# Patient Record
Sex: FEMALE | URBAN - METROPOLITAN AREA
[De-identification: names, ages, dates, MRNs, and addresses within clinical notes are randomized per-mention and may not be internally consistent; named-entity substitution may affect disease eponyms.]

---

## 2022-04-12 ENCOUNTER — APPOINTMENT (RX ONLY)
Dept: URBAN - METROPOLITAN AREA CLINIC 329 | Facility: CLINIC | Age: 37
Setting detail: DERMATOLOGY
End: 2022-04-12

## 2022-04-12 DIAGNOSIS — L65.9 NONSCARRING HAIR LOSS, UNSPECIFIED: ICD-10-CM | Status: INADEQUATELY CONTROLLED

## 2022-04-12 PROCEDURE — ? PRESCRIPTION

## 2022-04-12 PROCEDURE — ? PRESCRIPTION MEDICATION MANAGEMENT

## 2022-04-12 PROCEDURE — ? ADDITIONAL NOTES

## 2022-04-12 PROCEDURE — 99204 OFFICE O/P NEW MOD 45 MIN: CPT

## 2022-04-12 PROCEDURE — ? PHOTO-DOCUMENTATION

## 2022-04-12 PROCEDURE — ? COUNSELING

## 2022-04-12 PROCEDURE — ? FULL BODY SKIN EXAM - DECLINED

## 2022-04-12 RX ORDER — CLOBETASOL PROPIONATE 0.5 MG/ML
SOLUTION TOPICAL
Qty: 1 | Refills: 1 | Status: ERX | COMMUNITY
Start: 2022-04-12

## 2022-04-12 RX ADMIN — CLOBETASOL PROPIONATE: 0.5 SOLUTION TOPICAL at 00:00

## 2022-04-12 ASSESSMENT — LOCATION SIMPLE DESCRIPTION DERM
LOCATION SIMPLE: LEFT SCALP
LOCATION SIMPLE: LEFT SCALP
LOCATION SIMPLE: ANTERIOR SCALP
LOCATION SIMPLE: SCALP

## 2022-04-12 ASSESSMENT — LOCATION ZONE DERM
LOCATION ZONE: SCALP
LOCATION ZONE: SCALP

## 2022-04-12 ASSESSMENT — LOCATION DETAILED DESCRIPTION DERM
LOCATION DETAILED: LEFT MEDIAL FRONTAL SCALP
LOCATION DETAILED: MID-FRONTAL SCALP
LOCATION DETAILED: LEFT SUPERIOR PARIETAL SCALP
LOCATION DETAILED: LEFT MEDIAL FRONTAL SCALP

## 2022-04-12 NOTE — PROCEDURE: PRESCRIPTION MEDICATION MANAGEMENT
Initiate Treatment: Clobetasol solution qhs until two bottles are finished
Continue Regimen: Multivitamin \\nRogaine
Render In Strict Bullet Format?: No
Detail Level: Zone

## 2023-10-25 ENCOUNTER — ANESTHESIA EVENT (OUTPATIENT)
Dept: SURGERY | Age: 38
End: 2023-10-25
Payer: COMMERCIAL

## 2023-10-25 ENCOUNTER — ANESTHESIA (OUTPATIENT)
Dept: SURGERY | Age: 38
End: 2023-10-25
Payer: COMMERCIAL

## 2023-10-25 ENCOUNTER — HOSPITAL ENCOUNTER (OUTPATIENT)
Age: 38
Setting detail: OUTPATIENT SURGERY
Discharge: HOME | End: 2023-10-25
Attending: OBSTETRICS & GYNECOLOGY | Admitting: OBSTETRICS & GYNECOLOGY
Payer: COMMERCIAL

## 2023-10-25 VITALS
SYSTOLIC BLOOD PRESSURE: 100 MMHG | RESPIRATION RATE: 14 BRPM | WEIGHT: 126.2 LBS | OXYGEN SATURATION: 100 % | HEIGHT: 63 IN | DIASTOLIC BLOOD PRESSURE: 63 MMHG | BODY MASS INDEX: 22.36 KG/M2 | HEART RATE: 71 BPM | TEMPERATURE: 97.9 F

## 2023-10-25 PROBLEM — O02.1 MISSED ABORTION: Status: ACTIVE | Noted: 2023-10-25

## 2023-10-25 LAB
ABO + RH BLD: NORMAL
BLOOD BANK CMNT PATIENT-IMP: NORMAL
BLOOD GROUP ANTIBODIES SERPL: NORMAL
BLOOD GROUP ANTIBODIES SERPL: NORMAL
SPECIMEN EXP DATE BLD: NORMAL
WEAK D AG RBC QL: NORMAL

## 2023-10-25 PROCEDURE — 6370000000 HC RX 637 (ALT 250 FOR IP): Performed by: ANESTHESIOLOGY

## 2023-10-25 PROCEDURE — 3600000002 HC SURGERY LEVEL 2 BASE: Performed by: OBSTETRICS & GYNECOLOGY

## 2023-10-25 PROCEDURE — 7100000001 HC PACU RECOVERY - ADDTL 15 MIN: Performed by: OBSTETRICS & GYNECOLOGY

## 2023-10-25 PROCEDURE — 2580000003 HC RX 258: Performed by: ANESTHESIOLOGY

## 2023-10-25 PROCEDURE — 2709999900 HC NON-CHARGEABLE SUPPLY: Performed by: OBSTETRICS & GYNECOLOGY

## 2023-10-25 PROCEDURE — 7100000011 HC PHASE II RECOVERY - ADDTL 15 MIN: Performed by: OBSTETRICS & GYNECOLOGY

## 2023-10-25 PROCEDURE — 86870 RBC ANTIBODY IDENTIFICATION: CPT

## 2023-10-25 PROCEDURE — 6360000002 HC RX W HCPCS: Performed by: ANESTHESIOLOGY

## 2023-10-25 PROCEDURE — 3700000000 HC ANESTHESIA ATTENDED CARE: Performed by: OBSTETRICS & GYNECOLOGY

## 2023-10-25 PROCEDURE — 7100000000 HC PACU RECOVERY - FIRST 15 MIN: Performed by: OBSTETRICS & GYNECOLOGY

## 2023-10-25 PROCEDURE — 7100000010 HC PHASE II RECOVERY - FIRST 15 MIN: Performed by: OBSTETRICS & GYNECOLOGY

## 2023-10-25 PROCEDURE — 6360000002 HC RX W HCPCS: Performed by: NURSE ANESTHETIST, CERTIFIED REGISTERED

## 2023-10-25 PROCEDURE — 3600000012 HC SURGERY LEVEL 2 ADDTL 15MIN: Performed by: OBSTETRICS & GYNECOLOGY

## 2023-10-25 PROCEDURE — 3700000001 HC ADD 15 MINUTES (ANESTHESIA): Performed by: OBSTETRICS & GYNECOLOGY

## 2023-10-25 PROCEDURE — 86901 BLOOD TYPING SEROLOGIC RH(D): CPT

## 2023-10-25 PROCEDURE — 2580000003 HC RX 258: Performed by: NURSE ANESTHETIST, CERTIFIED REGISTERED

## 2023-10-25 PROCEDURE — 86850 RBC ANTIBODY SCREEN: CPT

## 2023-10-25 PROCEDURE — 2500000003 HC RX 250 WO HCPCS: Performed by: OBSTETRICS & GYNECOLOGY

## 2023-10-25 PROCEDURE — 2500000003 HC RX 250 WO HCPCS: Performed by: NURSE ANESTHETIST, CERTIFIED REGISTERED

## 2023-10-25 PROCEDURE — 88305 TISSUE EXAM BY PATHOLOGIST: CPT

## 2023-10-25 PROCEDURE — 86900 BLOOD TYPING SEROLOGIC ABO: CPT

## 2023-10-25 PROCEDURE — 2580000003 HC RX 258: Performed by: OBSTETRICS & GYNECOLOGY

## 2023-10-25 RX ORDER — DEXTROSE MONOHYDRATE 100 MG/ML
INJECTION, SOLUTION INTRAVENOUS CONTINUOUS PRN
Status: DISCONTINUED | OUTPATIENT
Start: 2023-10-25 | End: 2023-10-25 | Stop reason: HOSPADM

## 2023-10-25 RX ORDER — ACETAMINOPHEN 500 MG
1000 TABLET ORAL ONCE
Status: COMPLETED | OUTPATIENT
Start: 2023-10-25 | End: 2023-10-25

## 2023-10-25 RX ORDER — SODIUM CHLORIDE 9 MG/ML
INJECTION, SOLUTION INTRAVENOUS PRN
Status: DISCONTINUED | OUTPATIENT
Start: 2023-10-25 | End: 2023-10-25 | Stop reason: HOSPADM

## 2023-10-25 RX ORDER — DIPHENHYDRAMINE HYDROCHLORIDE 50 MG/ML
12.5 INJECTION INTRAMUSCULAR; INTRAVENOUS
Status: DISCONTINUED | OUTPATIENT
Start: 2023-10-25 | End: 2023-10-25 | Stop reason: HOSPADM

## 2023-10-25 RX ORDER — MIDAZOLAM HYDROCHLORIDE 2 MG/2ML
2 INJECTION, SOLUTION INTRAMUSCULAR; INTRAVENOUS
Status: COMPLETED | OUTPATIENT
Start: 2023-10-25 | End: 2023-10-25

## 2023-10-25 RX ORDER — HALOPERIDOL 5 MG/ML
1 INJECTION INTRAMUSCULAR
Status: DISCONTINUED | OUTPATIENT
Start: 2023-10-25 | End: 2023-10-25 | Stop reason: HOSPADM

## 2023-10-25 RX ORDER — SODIUM CHLORIDE 0.9 % (FLUSH) 0.9 %
5-40 SYRINGE (ML) INJECTION EVERY 12 HOURS SCHEDULED
Status: DISCONTINUED | OUTPATIENT
Start: 2023-10-25 | End: 2023-10-25 | Stop reason: HOSPADM

## 2023-10-25 RX ORDER — PROPOFOL 10 MG/ML
INJECTION, EMULSION INTRAVENOUS PRN
Status: DISCONTINUED | OUTPATIENT
Start: 2023-10-25 | End: 2023-10-25 | Stop reason: SDUPTHER

## 2023-10-25 RX ORDER — FENTANYL CITRATE 50 UG/ML
INJECTION, SOLUTION INTRAMUSCULAR; INTRAVENOUS PRN
Status: DISCONTINUED | OUTPATIENT
Start: 2023-10-25 | End: 2023-10-25 | Stop reason: SDUPTHER

## 2023-10-25 RX ORDER — SODIUM CHLORIDE, SODIUM LACTATE, POTASSIUM CHLORIDE, CALCIUM CHLORIDE 600; 310; 30; 20 MG/100ML; MG/100ML; MG/100ML; MG/100ML
INJECTION, SOLUTION INTRAVENOUS CONTINUOUS
Status: DISCONTINUED | OUTPATIENT
Start: 2023-10-25 | End: 2023-10-25 | Stop reason: HOSPADM

## 2023-10-25 RX ORDER — IBUPROFEN 800 MG/1
800 TABLET ORAL EVERY 6 HOURS PRN
Qty: 30 TABLET | Refills: 0 | Status: SHIPPED | OUTPATIENT
Start: 2023-10-25

## 2023-10-25 RX ORDER — LIDOCAINE HYDROCHLORIDE 10 MG/ML
1 INJECTION, SOLUTION INFILTRATION; PERINEURAL
Status: DISCONTINUED | OUTPATIENT
Start: 2023-10-25 | End: 2023-10-25 | Stop reason: HOSPADM

## 2023-10-25 RX ORDER — LIDOCAINE HYDROCHLORIDE 20 MG/ML
INJECTION, SOLUTION EPIDURAL; INFILTRATION; INTRACAUDAL; PERINEURAL PRN
Status: DISCONTINUED | OUTPATIENT
Start: 2023-10-25 | End: 2023-10-25 | Stop reason: SDUPTHER

## 2023-10-25 RX ORDER — OXYCODONE HYDROCHLORIDE 5 MG/1
5 TABLET ORAL
Status: DISCONTINUED | OUTPATIENT
Start: 2023-10-25 | End: 2023-10-25 | Stop reason: HOSPADM

## 2023-10-25 RX ORDER — SCOLOPAMINE TRANSDERMAL SYSTEM 1 MG/1
1 PATCH, EXTENDED RELEASE TRANSDERMAL ONCE
Status: DISCONTINUED | OUTPATIENT
Start: 2023-10-25 | End: 2023-10-25 | Stop reason: HOSPADM

## 2023-10-25 RX ORDER — ONDANSETRON 2 MG/ML
INJECTION INTRAMUSCULAR; INTRAVENOUS PRN
Status: DISCONTINUED | OUTPATIENT
Start: 2023-10-25 | End: 2023-10-25 | Stop reason: SDUPTHER

## 2023-10-25 RX ORDER — SODIUM CHLORIDE 0.9 % (FLUSH) 0.9 %
5-40 SYRINGE (ML) INJECTION PRN
Status: DISCONTINUED | OUTPATIENT
Start: 2023-10-25 | End: 2023-10-25 | Stop reason: HOSPADM

## 2023-10-25 RX ORDER — PROCHLORPERAZINE EDISYLATE 5 MG/ML
5 INJECTION INTRAMUSCULAR; INTRAVENOUS
Status: DISCONTINUED | OUTPATIENT
Start: 2023-10-25 | End: 2023-10-25 | Stop reason: HOSPADM

## 2023-10-25 RX ORDER — DEXAMETHASONE SODIUM PHOSPHATE 10 MG/ML
INJECTION INTRAMUSCULAR; INTRAVENOUS PRN
Status: DISCONTINUED | OUTPATIENT
Start: 2023-10-25 | End: 2023-10-25 | Stop reason: SDUPTHER

## 2023-10-25 RX ADMIN — ACETAMINOPHEN 1000 MG: 500 TABLET, FILM COATED ORAL at 12:01

## 2023-10-25 RX ADMIN — FENTANYL CITRATE 25 MCG: 50 INJECTION, SOLUTION INTRAMUSCULAR; INTRAVENOUS at 12:43

## 2023-10-25 RX ADMIN — DOXYCYCLINE 100 MG: 100 INJECTION, POWDER, LYOPHILIZED, FOR SOLUTION INTRAVENOUS at 12:38

## 2023-10-25 RX ADMIN — FENTANYL CITRATE 50 MCG: 50 INJECTION, SOLUTION INTRAMUSCULAR; INTRAVENOUS at 12:27

## 2023-10-25 RX ADMIN — DEXAMETHASONE SODIUM PHOSPHATE 10 MG: 10 INJECTION INTRAMUSCULAR; INTRAVENOUS at 12:37

## 2023-10-25 RX ADMIN — LIDOCAINE HYDROCHLORIDE 40 MG: 20 INJECTION, SOLUTION EPIDURAL; INFILTRATION; INTRACAUDAL; PERINEURAL at 12:27

## 2023-10-25 RX ADMIN — ONDANSETRON 4 MG: 2 INJECTION INTRAMUSCULAR; INTRAVENOUS at 12:45

## 2023-10-25 RX ADMIN — PHENYLEPHRINE HYDROCHLORIDE 100 MCG: 10 INJECTION INTRAVENOUS at 12:53

## 2023-10-25 RX ADMIN — PROPOFOL 200 MG: 10 INJECTION, EMULSION INTRAVENOUS at 12:32

## 2023-10-25 RX ADMIN — SODIUM CHLORIDE, POTASSIUM CHLORIDE, SODIUM LACTATE AND CALCIUM CHLORIDE: 600; 310; 30; 20 INJECTION, SOLUTION INTRAVENOUS at 12:06

## 2023-10-25 RX ADMIN — MIDAZOLAM 2 MG: 1 INJECTION INTRAMUSCULAR; INTRAVENOUS at 12:18

## 2023-10-25 RX ADMIN — FENTANYL CITRATE 25 MCG: 50 INJECTION, SOLUTION INTRAMUSCULAR; INTRAVENOUS at 12:45

## 2023-10-25 ASSESSMENT — PAIN - FUNCTIONAL ASSESSMENT: PAIN_FUNCTIONAL_ASSESSMENT: 0-10

## 2023-10-25 ASSESSMENT — PAIN SCALES - GENERAL
PAINLEVEL_OUTOF10: 3
PAINLEVEL_OUTOF10: 2

## 2023-10-25 ASSESSMENT — PAIN DESCRIPTION - DESCRIPTORS: DESCRIPTORS: PRESSURE

## 2023-10-25 ASSESSMENT — PAIN DESCRIPTION - PAIN TYPE: TYPE: SURGICAL PAIN

## 2023-10-25 ASSESSMENT — PAIN DESCRIPTION - FREQUENCY: FREQUENCY: CONTINUOUS

## 2023-10-25 ASSESSMENT — PAIN DESCRIPTION - LOCATION: LOCATION: ABDOMEN

## 2023-10-25 NOTE — OP NOTE
GYN Operative Report    Patient: Gillian Jenkins MRN: [id number]  SSN: [id number]    YOB: 1985  Age: 41 y.o. Sex: female       Date of Surgery:  10/25/2023    Preoperative Diagnosis: Missed ab [O02.1] (8wks)    Postoperative Diagnosis: Same    Surgeon(s) and Role:     * Felix Alvarado MD - Primary    Anesthesia: General    Procedure: Dilation and curettage with suction    Estimated Blood Loss:  400cc    Drains: none    Implants:  * No implants in log *    Specimen:    ID Type Source Tests Collected by Time Destination   A : PRODUCTS OF CONCEPTION  Tissue Fetus SURGICAL PATHOLOGY Felix Alvarado MD 10/25/2023 1246         Findings:  Uterus enlarged 8wk size, boggy. Moderate amount of fetal tissue. Procedure: Patient was taken to the operating room where general anesthesia was obtained without difficulty. She was placed in a dorsal lithotomy position with yellow fin stirrups. She was prepped and draped in the usual sterile fashion. Doxycycline 100 mg IV was given OCTOR. A time out was performed to ensure it was the correct patient, correct procedure, and to identify all allergies. A weighted speculum was placed in the vagina and the cervix identified. A tenaculum was placed on the anterior lip of the cervix. Sierra dilators were used to dilate to 34 [place]. A 10mm suction was then introduced into the uterine cavity to the fundus and suction applied to evacuate POCs. A gentle sharp curettage was then performed and good uterine cri noted 360 degrees around the uterine cavity. Despite cavity seeming empty, bleeding was increased. Suction curettage once again performed with 3 passes. This improved but did not stop bleeding. Uterine massaged performed and bleeding stopped. A gentle sharp curettage was then performed and good uterine cri noted 360 degrees around the uterine cavity. Massage performed again. Scant bleeding noted. Tenaculum was removed and sites were hemostatic.  All instruments removed from

## 2023-10-25 NOTE — ANESTHESIA PRE PROCEDURE
Pulmonary:Negative Pulmonary ROS and normal exam                               Cardiovascular:Negative CV ROS                      Neuro/Psych:               GI/Hepatic/Renal: Neg GI/Hepatic/Renal ROS            Endo/Other:                      ROS comment: Missed  Abdominal:             Vascular: Other Findings:           Anesthesia Plan      general     ASA 1 - emergent     (LMA)  Induction: intravenous. MIPS: Postoperative opioids intended and Prophylactic antiemetics administered. Anesthetic plan and risks discussed with patient and spouse.                         Antonia Kussmaul, MD   10/25/2023

## 2023-10-25 NOTE — ANESTHESIA POSTPROCEDURE EVALUATION
Department of Anesthesiology  Postprocedure Note    Patient: Radha Duron  MRN: [id number]  YOB: 1985  Date of evaluation: 10/25/2023      Procedure Summary     Date: 10/25/23 Room / Location: [facility] MAIN OR [address] [place] [address] MAIN OR    Anesthesia Start: 1247 Anesthesia Stop: 0789    Procedure: DILATATION AND CURETTAGE SUCTION (Vagina ) Diagnosis:       Missed ab      (Missed ab [O02.1])    Surgeons: Dileep Rojas MD Responsible Provider: Cate Vergara MD    Anesthesia Type: General ASA Status: 1 - Emergent          Anesthesia Type: General    Laura Phase I: Laura Score: 10    Laura Phase II:        Anesthesia Post Evaluation    Patient location during evaluation: PACU  Patient participation: complete - patient participated  Level of consciousness: awake and alert  Airway patency: patent  Nausea: well controlled. Complications: no  Cardiovascular status: acceptable.   Respiratory status: acceptable  Hydration status: stable  Pain management: adequate

## 2024-05-14 ENCOUNTER — OFFICE VISIT (OUTPATIENT)
Dept: OBGYN CLINIC | Age: 39
End: 2024-05-14
Payer: COMMERCIAL

## 2024-05-14 VITALS
DIASTOLIC BLOOD PRESSURE: 68 MMHG | WEIGHT: 132 LBS | BODY MASS INDEX: 23.39 KG/M2 | SYSTOLIC BLOOD PRESSURE: 104 MMHG | HEIGHT: 63 IN

## 2024-05-14 DIAGNOSIS — Z31.69 INFERTILITY COUNSELING: Primary | ICD-10-CM

## 2024-05-14 PROCEDURE — 99203 OFFICE O/P NEW LOW 30 MIN: CPT | Performed by: OBSTETRICS & GYNECOLOGY

## 2024-05-14 RX ORDER — PNV NO.95/FERROUS FUM/FOLIC AC 28MG-0.8MG
1 TABLET ORAL DAILY
Qty: 30 TABLET | Refills: 9 | Status: SHIPPED | OUTPATIENT
Start: 2024-05-14

## 2024-05-14 NOTE — PROGRESS NOTES
CC:   Chief Complaint   Patient presents with    New Patient     Conception counseling - previous miscarriage in 10/2023 (D&C)         HPI:    Murtaza  is a 38 y.o. , , Patient's last menstrual period was 2024.,  who is seen for preconception counseling and concern about fertility.     Menses:  Regular     10/2023 8 week MAB s/p D&C  Trying x 3 months. + Ovulation. Timing intercourse around ovulation, but unable to last month.   Regular cycles   Non-smoker with no medical issues   Same father as SAB      GYN HISTORY:  As per HPI     Hx STDs:  No    Last Pap:  DUE    Current Outpatient Medications on File Prior to Visit   Medication Sig Dispense Refill    [id number]-M0-PT (PRENA1 PO) Take by mouth       No current facility-administered medications on file prior to visit.         No past medical history on file.      Past Surgical History:   Procedure Laterality Date    DILATION AND CURETTAGE OF UTERUS N/A 10/25/2023    DILATATION AND CURETTAGE SUCTION performed by Breanna Del Valle MD at [facility] [place]         Outpatient Encounter Medications as of 2024   Medication Sig Dispense Refill    Prenatal Vit-Fe Fumarate-FA (PRENATAL VITAMIN) 27-0.8 MG TABS Take 1 tablet by mouth daily 30 tablet 9    Eqfbit-U4-R9-S02-X8-GQ (PRENA1 PO) Take by mouth      [DISCONTINUED] ibuprofen (ADVIL;MOTRIN) 800 MG tablet Take 1 tablet by mouth every 6 hours as needed for Pain 30 tablet 0     No facility-administered encounter medications on file as of 2024.         Allergies   Allergen Reactions    Pcn [Penicillins] Angioedema         Family History   Problem Relation Age of Onset    Breast Cancer Neg Hx     Colon Cancer Neg Hx     Ovarian Cancer Neg Hx          Social History     Socioeconomic History    Marital status:    Tobacco Use    Smoking status: Never    Smokeless tobacco: Never   Vaping Use    Vaping Use: Never used   Substance and Sexual Activity    Alcohol use: Not Currently    Drug use: Not

## 2024-05-31 ENCOUNTER — NURSE ONLY (OUTPATIENT)
Dept: OBGYN CLINIC | Age: 39
End: 2024-05-31

## 2024-05-31 DIAGNOSIS — Z31.69 INFERTILITY COUNSELING: ICD-10-CM

## 2024-05-31 LAB
ESTRADIOL SERPL-MCNC: 45.1 PG/ML
FSH SERPL-ACNC: 7.5 MIU/ML
LH SERPL-ACNC: 3.8 MIU/ML
PROGEST SERPL-MCNC: 0.29 NG/ML
TSH W FREE THYROID IF ABNORMAL: 0.88 UIU/ML (ref 0.27–4.2)

## 2024-06-06 LAB — MIS SERPL-MCNC: 1.19 NG/ML

## 2024-06-27 ENCOUNTER — PATIENT MESSAGE (OUTPATIENT)
Dept: OBGYN CLINIC | Age: 39
End: 2024-06-27

## 2024-07-08 ENCOUNTER — NURSE ONLY (OUTPATIENT)
Dept: OBGYN CLINIC | Age: 39
End: 2024-07-08

## 2024-07-08 DIAGNOSIS — Z32.01 POSITIVE PREGNANCY TEST: Primary | ICD-10-CM

## 2024-07-08 DIAGNOSIS — Z32.01 POSITIVE PREGNANCY TEST: ICD-10-CM

## 2024-07-08 LAB — HCG SERPL-ACNC: NORMAL MIU/ML

## 2024-07-12 ENCOUNTER — LAB (OUTPATIENT)
Dept: OBGYN CLINIC | Age: 39
End: 2024-07-12

## 2024-07-12 DIAGNOSIS — Z32.01 POSITIVE PREGNANCY TEST: ICD-10-CM

## 2024-07-12 DIAGNOSIS — Z32.01 POSITIVE PREGNANCY TEST: Primary | ICD-10-CM

## 2024-07-12 LAB — HCG SERPL-ACNC: NORMAL MIU/ML

## 2024-07-30 ENCOUNTER — PROCEDURE VISIT (OUTPATIENT)
Dept: OBGYN CLINIC | Age: 39
End: 2024-07-30
Payer: COMMERCIAL

## 2024-07-30 ENCOUNTER — OFFICE VISIT (OUTPATIENT)
Dept: OBGYN CLINIC | Age: 39
End: 2024-07-30
Payer: COMMERCIAL

## 2024-07-30 VITALS
BODY MASS INDEX: 23.74 KG/M2 | HEIGHT: 63 IN | WEIGHT: 134 LBS | SYSTOLIC BLOOD PRESSURE: 110 MMHG | DIASTOLIC BLOOD PRESSURE: 74 MMHG

## 2024-07-30 DIAGNOSIS — N92.6 MISSED MENSES: ICD-10-CM

## 2024-07-30 DIAGNOSIS — Z32.01 POSITIVE PREGNANCY TEST: Primary | ICD-10-CM

## 2024-07-30 DIAGNOSIS — O09.519 ENCOUNTER FOR SUPERVISION OF HIGH RISK PREGNANCY DUE TO ADVANCED MATERNAL AGE IN PRIMIGRAVIDA: Primary | ICD-10-CM

## 2024-07-30 PROCEDURE — 99214 OFFICE O/P EST MOD 30 MIN: CPT | Performed by: OBSTETRICS & GYNECOLOGY

## 2024-07-30 PROCEDURE — 76830 TRANSVAGINAL US NON-OB: CPT | Performed by: OBSTETRICS & GYNECOLOGY

## 2024-07-30 RX ORDER — ASPIRIN 81 MG/1
162 TABLET ORAL DAILY
Qty: 60 TABLET | Refills: 12 | Status: SHIPPED | OUTPATIENT
Start: 2024-07-30

## 2024-07-30 RX ORDER — PYRIDOXINE HCL (VITAMIN B6) 25 MG
25 TABLET ORAL DAILY
COMMUNITY

## 2024-07-30 RX ORDER — MULTIVIT WITH MINERALS/LUTEIN
250 TABLET ORAL DAILY
COMMUNITY

## 2024-07-30 NOTE — PROGRESS NOTES
CC:  Missed Period      HPI:  38 y.o.  presents for pregnancy confirmation and establish SATYA.  Patient's last menstrual period was 2024.,  sure, regular cycles.      Mild n/v  No vb/cramping      OB hx:    Fam hx any chromosomal or inheritable disorders: No      Her Ob history is as follows:  # 1 - Date: None, Sex: None, Weight: None, GA: None, Delivery: None, Apgar1: None, Apgar5: None, Living: None, Birth Comments: None      Past medical History:   Active Ambulatory Problems     Diagnosis Date Noted    Missed  10/25/2023     Resolved Ambulatory Problems     Diagnosis Date Noted    No Resolved Ambulatory Problems     No Additional Past Medical History       Current Outpatient Medications   Medication Sig    Calcium Carbonate-Vitamin D (OSCAL 500 D-3 PO) Take 600 mg by mouth 2 times daily (with meals)    Ascorbic Acid (VITAMIN C) 250 MG tablet Take 1 tablet by mouth daily    pyridoxine (B-6) 25 MG tablet Take 1 tablet by mouth daily    aspirin 81 MG EC tablet Take 2 tablets by mouth daily    Prenatal Vit-Fe Fumarate-FA (PRENATAL VITAMIN) 27-0.8 MG TABS Take 1 tablet by mouth daily     No current facility-administered medications for this visit.       Past Surgical:  has a past surgical history that includes Dilation and curettage of uterus (N/A, 10/25/2023).    Allergies   Allergen Reactions    Pcn [Penicillins] Angioedema         Current Outpatient Medications on File Prior to Visit   Medication Sig Dispense Refill    Calcium Carbonate-Vitamin D (OSCAL 500 D-3 PO) Take 600 mg by mouth 2 times daily (with meals)      Ascorbic Acid (VITAMIN C) 250 MG tablet Take 1 tablet by mouth daily      pyridoxine (B-6) 25 MG tablet Take 1 tablet by mouth daily      Prenatal Vit-Fe Fumarate-FA (PRENATAL VITAMIN) 27-0.8 MG TABS Take 1 tablet by mouth daily 30 tablet 9     No current facility-administered medications on file prior to visit.       GYN hx:    Murtaza  reports that she has never smoked. She has

## 2024-08-05 NOTE — PROGRESS NOTES
NOB consult with pt. Labs today: Prenatal panel, PIH, Carrier testing, NIPT. Offered pt the option of CF, SMA, and Fragile X carrier testing. Pt desires testing. No previous testing in chart. Offered pt the option of genetic screening (1st screen vs Tetra vs NIPT). Pt desires NIPT. Instructed pt on exercise/nutrition in pregnancy. Reviewed [facility] preg book. Advised pt on using [facility] for hospital needs and [facility] L&D for pregnancy related emergencies. IMPACT[facility] Program discussed with patient and sheet given to her during visit today. Encouraged her to inform us should she start having increase in depression/anxiety. Pt states understanding. NOB forms signed, scanned, and given to pt.     Vitals:  Weight: 136 lb  BMI: 24  BP: 208/76    Medical Hx: Advanced maternal age (2024). - History of MRSA (2012) MRSA on buttocks.    Surgical Hx: Dilation and curettage of uterus (2023). - [place] tooth extraction.    Last Pap: 08/2021 WNL.    Pt OB c/o: Patient states that she is currently taking care of her mother who lives in [place] and drives two hours each way to care for her. Patient states she is extremely stressed and anxious, voices that she would like to see if she can get medication for stress/anxiety. States \"this is not like me.\" Message sent to Dr Mac regarding request.      Fam hx any chromosomal or inheritable disorders: None.    COVID Vaccine: x0 per patient  Flu Vaccine: Discussed flu recommendations with patient. Patient declines flu vaccine.    OB hx: G1: 2023, SAB.   G2: 08/2024, Current.     NV: Next appointment scheduled on 9/3 with Dr Mac

## 2024-08-12 ENCOUNTER — NURSE ONLY (OUTPATIENT)
Dept: OBGYN CLINIC | Age: 39
End: 2024-08-12

## 2024-08-12 VITALS
BODY MASS INDEX: 25.03 KG/M2 | SYSTOLIC BLOOD PRESSURE: 108 MMHG | DIASTOLIC BLOOD PRESSURE: 76 MMHG | HEIGHT: 62 IN | WEIGHT: 136 LBS

## 2024-08-12 DIAGNOSIS — Z3A.10 10 WEEKS GESTATION OF PREGNANCY: Primary | ICD-10-CM

## 2024-08-12 DIAGNOSIS — O09.521 ADVANCED MATERNAL AGE IN MULTIGRAVIDA, FIRST TRIMESTER: ICD-10-CM

## 2024-08-12 LAB
ABO + RH BLD: NORMAL
BASOPHILS # BLD: 0 K/UL (ref 0–0.2)
BASOPHILS NFR BLD: 0 % (ref 0–2)
BLOOD GROUP ANTIBODIES SERPL: NORMAL
CREAT UR-MCNC: 16.2 MG/DL (ref 28–217)
DIFFERENTIAL METHOD BLD: ABNORMAL
EOSINOPHIL # BLD: 0 K/UL (ref 0–0.8)
EOSINOPHIL NFR BLD: 0 % (ref 0.5–7.8)
ERYTHROCYTE [DISTWIDTH] IN BLOOD BY AUTOMATED COUNT: 12.2 % (ref 11.9–14.6)
HCT VFR BLD AUTO: 38.6 % (ref 35.8–46.3)
HGB BLD-MCNC: 12.9 G/DL (ref 11.7–15.4)
IMM GRANULOCYTES # BLD AUTO: 0 K/UL (ref 0–0.5)
IMM GRANULOCYTES NFR BLD AUTO: 1 % (ref 0–5)
LYMPHOCYTES # BLD: 1.4 K/UL (ref 0.5–4.6)
LYMPHOCYTES NFR BLD: 17 % (ref 13–44)
MCH RBC QN AUTO: 30.9 PG (ref 26.1–32.9)
MCHC RBC AUTO-ENTMCNC: 33.4 G/DL (ref 31.4–35)
MCV RBC AUTO: 92.6 FL (ref 82–102)
MONOCYTES # BLD: 0.4 K/UL (ref 0.1–1.3)
MONOCYTES NFR BLD: 5 % (ref 4–12)
NEUTS SEG # BLD: 6.3 K/UL (ref 1.7–8.2)
NEUTS SEG NFR BLD: 77 % (ref 43–78)
NRBC # BLD: 0 K/UL (ref 0–0.2)
PLATELET # BLD AUTO: 302 K/UL (ref 150–450)
PMV BLD AUTO: 10.4 FL (ref 9.4–12.3)
PROT UR-MCNC: <6 MG/DL
PROT/CREAT UR-RTO: ABNORMAL
RBC # BLD AUTO: 4.17 M/UL (ref 4.05–5.2)
WBC # BLD AUTO: 8.1 K/UL (ref 4.3–11.1)

## 2024-08-13 LAB
ALBUMIN SERPL-MCNC: 3.7 G/DL (ref 3.5–5)
ALBUMIN/GLOB SERPL: 1.1 (ref 1–1.9)
ALP SERPL-CCNC: 54 U/L (ref 35–104)
ALT SERPL-CCNC: 7 U/L (ref 12–65)
ANION GAP SERPL CALC-SCNC: 13 MMOL/L (ref 9–18)
AST SERPL-CCNC: 14 U/L (ref 15–37)
BILIRUB SERPL-MCNC: 0.2 MG/DL (ref 0–1.2)
BUN SERPL-MCNC: 4 MG/DL (ref 6–23)
CALCIUM SERPL-MCNC: 9.4 MG/DL (ref 8.8–10.2)
CHLORIDE SERPL-SCNC: 103 MMOL/L (ref 98–107)
CO2 SERPL-SCNC: 22 MMOL/L (ref 20–28)
CREAT SERPL-MCNC: 0.49 MG/DL (ref 0.6–1.1)
GLOBULIN SER CALC-MCNC: 3.3 G/DL (ref 2.3–3.5)
GLUCOSE SERPL-MCNC: 74 MG/DL (ref 70–99)
HBV SURFACE AG SER QL: NONREACTIVE
HCV AB SER QL: NONREACTIVE
HIV 1+2 AB+HIV1 P24 AG SERPL QL IA: NONREACTIVE
HIV 1/2 RESULT COMMENT: NORMAL
LDH SERPL L TO P-CCNC: 150 U/L (ref 127–281)
POTASSIUM SERPL-SCNC: 3.8 MMOL/L (ref 3.5–5.1)
PROT SERPL-MCNC: 7 G/DL (ref 6.3–8.2)
RUBV IGG SERPL IA-ACNC: 79.2 IU/ML
SODIUM SERPL-SCNC: 137 MMOL/L (ref 136–145)
T PALLIDUM AB SER QL IA: NONREACTIVE
URATE SERPL-MCNC: 2.9 MG/DL (ref 2.5–7.1)

## 2024-08-14 LAB
BACTERIA SPEC CULT: NORMAL
BACTERIA SPEC CULT: NORMAL
SERVICE CMNT-IMP: NORMAL

## 2024-08-15 ENCOUNTER — TELEPHONE (OUTPATIENT)
Dept: OBGYN CLINIC | Age: 39
End: 2024-08-15

## 2024-08-15 NOTE — TELEPHONE ENCOUNTER
Have attempted to call patient x2 regarding an appointment to discuss anxiety and medication. LVM asking patient for return call.

## 2024-08-19 LAB
Lab: ABNORMAL
NTRA 22Q11.2 DELETION SYNDROME POPULATION-BASED RISK TEXT: ABNORMAL
NTRA 22Q11.2 DELETION SYNDROME RESULT TEXT: ABNORMAL
NTRA 22Q11.2 DELETION SYNDROME RISK SCORE TEXT: ABNORMAL
NTRA FETAL FRACTION: ABNORMAL
NTRA FETAL RHD SUMMARY: ABNORMAL
NTRA GENDER OF FETUS: ABNORMAL
NTRA MONOSOMY X AGE-BASED RISK TEXT: ABNORMAL
NTRA MONOSOMY X RESULT TEXT: ABNORMAL
NTRA MONOSOMY X RISK SCORE TEXT: ABNORMAL
NTRA TRIPLOIDY RESULT TEXT: ABNORMAL
NTRA TRISOMY 13 AGE-BASED RISK TEXT: ABNORMAL
NTRA TRISOMY 13 RESULT TEXT: ABNORMAL
NTRA TRISOMY 13 RISK SCORE TEXT: ABNORMAL
NTRA TRISOMY 18 AGE-BASED RISK TEXT: ABNORMAL
NTRA TRISOMY 18 RESULT TEXT: ABNORMAL
NTRA TRISOMY 18 RISK SCORE TEXT: ABNORMAL
NTRA TRISOMY 21 AGE-BASED RISK TEXT: ABNORMAL
NTRA TRISOMY 21 RESULT TEXT: ABNORMAL
NTRA TRISOMY 21 RISK SCORE TEXT: ABNORMAL

## 2024-08-21 RX ORDER — ASPIRIN 81 MG/1
162 TABLET ORAL DAILY
Qty: 60 TABLET | Refills: 12 | Status: SHIPPED | OUTPATIENT
Start: 2024-08-21

## 2024-08-30 ENCOUNTER — APPOINTMENT (OUTPATIENT)
Dept: ULTRASOUND IMAGING | Age: 39
End: 2024-08-30
Payer: COMMERCIAL

## 2024-08-30 ENCOUNTER — HOSPITAL ENCOUNTER (EMERGENCY)
Age: 39
Discharge: HOME | End: 2024-08-30
Payer: COMMERCIAL

## 2024-08-30 VITALS
HEART RATE: 87 BPM | SYSTOLIC BLOOD PRESSURE: 105 MMHG | DIASTOLIC BLOOD PRESSURE: 72 MMHG | WEIGHT: 136 LBS | BODY MASS INDEX: 25.03 KG/M2 | OXYGEN SATURATION: 93 % | RESPIRATION RATE: 18 BRPM | TEMPERATURE: 97.9 F | HEIGHT: 62 IN

## 2024-08-30 DIAGNOSIS — O20.0 THREATENED ABORTION, ANTEPARTUM: Primary | ICD-10-CM

## 2024-08-30 LAB
ALBUMIN SERPL-MCNC: 3.4 G/DL (ref 3.5–5)
ALBUMIN/GLOB SERPL: 1.1 (ref 1–1.9)
ALP SERPL-CCNC: 54 U/L (ref 35–104)
ALT SERPL-CCNC: 13 U/L (ref 12–65)
ANION GAP SERPL CALC-SCNC: 12 MMOL/L (ref 9–18)
APPEARANCE UR: CLEAR
AST SERPL-CCNC: 16 U/L (ref 15–37)
BASOPHILS # BLD: 0 K/UL (ref 0–0.2)
BASOPHILS NFR BLD: 0 % (ref 0–2)
BILIRUB SERPL-MCNC: <0.2 MG/DL (ref 0–1.2)
BILIRUB UR QL: NEGATIVE
BUN SERPL-MCNC: 5 MG/DL (ref 6–23)
CALCIUM SERPL-MCNC: 8.6 MG/DL (ref 8.8–10.2)
CHLORIDE SERPL-SCNC: 103 MMOL/L (ref 98–107)
CO2 SERPL-SCNC: 21 MMOL/L (ref 20–28)
COLOR UR: NORMAL
CREAT SERPL-MCNC: 0.44 MG/DL (ref 0.6–1.1)
DIFFERENTIAL METHOD BLD: ABNORMAL
EOSINOPHIL # BLD: 0.1 K/UL (ref 0–0.8)
EOSINOPHIL NFR BLD: 1 % (ref 0.5–7.8)
ERYTHROCYTE [DISTWIDTH] IN BLOOD BY AUTOMATED COUNT: 12.7 % (ref 11.9–14.6)
GLOBULIN SER CALC-MCNC: 3.1 G/DL (ref 2.3–3.5)
GLUCOSE SERPL-MCNC: 79 MG/DL (ref 70–99)
GLUCOSE UR STRIP.AUTO-MCNC: NEGATIVE MG/DL
HCG SERPL-ACNC: NORMAL MIU/ML
HCT VFR BLD AUTO: 36.1 % (ref 35.8–46.3)
HGB BLD-MCNC: 12.4 G/DL (ref 11.7–15.4)
HGB UR QL STRIP: NEGATIVE
IMM GRANULOCYTES # BLD AUTO: 0 K/UL (ref 0–0.5)
IMM GRANULOCYTES NFR BLD AUTO: 0 % (ref 0–5)
KETONES UR QL STRIP.AUTO: NEGATIVE MG/DL
LEUKOCYTE ESTERASE UR QL STRIP.AUTO: NEGATIVE
LYMPHOCYTES # BLD: 1.2 K/UL (ref 0.5–4.6)
LYMPHOCYTES NFR BLD: 12 % (ref 13–44)
MCH RBC QN AUTO: 30.2 PG (ref 26.1–32.9)
MCHC RBC AUTO-ENTMCNC: 34.3 G/DL (ref 31.4–35)
MCV RBC AUTO: 88 FL (ref 82–102)
MONOCYTES # BLD: 0.5 K/UL (ref 0.1–1.3)
MONOCYTES NFR BLD: 5 % (ref 4–12)
NEUTS SEG # BLD: 8.1 K/UL (ref 1.7–8.2)
NEUTS SEG NFR BLD: 82 % (ref 43–78)
NITRITE UR QL STRIP.AUTO: NEGATIVE
NRBC # BLD: 0 K/UL (ref 0–0.2)
PH UR STRIP: 7.5 (ref 5–9)
PLATELET # BLD AUTO: 269 K/UL (ref 150–450)
PMV BLD AUTO: 9.6 FL (ref 9.4–12.3)
POTASSIUM SERPL-SCNC: 3.6 MMOL/L (ref 3.5–5.1)
PROT SERPL-MCNC: 6.5 G/DL (ref 6.3–8.2)
PROT UR STRIP-MCNC: NEGATIVE MG/DL
RBC # BLD AUTO: 4.1 M/UL (ref 4.05–5.2)
SODIUM SERPL-SCNC: 136 MMOL/L (ref 136–145)
SP GR UR REFRACTOMETRY: <1.005 (ref 1–1.02)
UROBILINOGEN UR QL STRIP.AUTO: 0.2 EU/DL (ref 0.2–1)
WBC # BLD AUTO: 9.9 K/UL (ref 4.3–11.1)

## 2024-08-30 PROCEDURE — 6360000002 HC RX W HCPCS: Performed by: STUDENT IN AN ORGANIZED HEALTH CARE EDUCATION/TRAINING PROGRAM

## 2024-08-30 PROCEDURE — 84702 CHORIONIC GONADOTROPIN TEST: CPT

## 2024-08-30 PROCEDURE — 85025 COMPLETE CBC W/AUTO DIFF WBC: CPT

## 2024-08-30 PROCEDURE — 99284 EMERGENCY DEPT VISIT MOD MDM: CPT

## 2024-08-30 PROCEDURE — 80053 COMPREHEN METABOLIC PANEL: CPT

## 2024-08-30 PROCEDURE — 81003 URINALYSIS AUTO W/O SCOPE: CPT

## 2024-08-30 PROCEDURE — 96372 THER/PROPH/DIAG INJ SC/IM: CPT

## 2024-08-30 PROCEDURE — 76801 OB US < 14 WKS SINGLE FETUS: CPT

## 2024-08-30 RX ADMIN — RHO(D) IMMUNE GLOBULIN (HUMAN) 300 MCG: 1500 SOLUTION INTRAMUSCULAR at 16:22

## 2024-08-30 ASSESSMENT — ENCOUNTER SYMPTOMS
VOMITING: 0
SHORTNESS OF BREATH: 0
TROUBLE SWALLOWING: 0
PHOTOPHOBIA: 0
COUGH: 0
FACIAL SWELLING: 0

## 2024-08-30 NOTE — DISCHARGE INSTRUCTIONS
Your workup here was very reassuring overall.  As we discussed, you will need to follow-up with your OB/GYN at your scheduled appointment in 3 days.  Return here for new or worsening symptoms.    As we discussed, I did not find a life threatening cause of your symptoms today. However, THAT DOES NOT MEAN IT COULD NOT DEVELOP. If you develop ANY new or worsening symptoms, it is critical that you return for re-evaluation. This includes any symptoms that are concerning to you, especially symptoms such as severe abdominal pain, loss of consciousness, profuse vaginal bleeding, fevers.  If you do not return for re-evaluation, you risk serious complications, including death.

## 2024-08-30 NOTE — ED PROVIDER NOTES
Emergency Department Provider Note       PCP: No primary care provider on file.   Age: 38 y.o.   Sex: female     DISPOSITION Decision To Discharge 2024 04:00:15 PM  Condition at Disposition: Good       ICD-10-CM    1. Threatened , antepartum  O20.0           Medical Decision Making     In summary this is a 38-year-old female patient presenting for evaluation with concerns for possible vaginal bleeding in early pregnancy.  She did mention that she had cut herself shaving and I question whether this was the blood she saw.  Her UA did not show signs of blood today.  Her vitals were stable.  Hemoglobin unremarkable.  Ultrasound today showed normal fetal heart rate and no acute abnormalities.  Her abdominal exam is benign.  I do think she is safe for outpatient therapy.  She already has appointment scheduled with OB/GYN in 3 days which I feel is appropriate time to follow-up.  Counseled her on signs to return here for.  The patient has verbalized understanding and agreed to the plan.  Discharged in stable condition.  ED Course as of 24 1602   Fri Aug 30, 2024   1324 1:24 PM  No blood noted in the urine which is reassuring against vaginal bleeding [NR]      ED Course User Index  [NR] Ricardo Dorman PA     1 acute complicated illness or injury.  Patient was discharged risks and benefits of hospitalization were considered.  Shared medical decision making was utilized in creating the patients health plan today.  ED attending physician present in department at time of care. Based on current hospital policy, their co-signature is not required on this note.   I independently ordered and reviewed each unique test.       I interpreted the Ultrasound  iup.              History     38-year-old female patient G2, P1 approximately 13 weeks pregnant presents today with concerns for possible vaginal bleeding.  She states for the past few days she has had some increased intermittent bilateral abdominal

## 2024-08-30 NOTE — ED TRIAGE NOTES
Pt is 13 weeks pregnant.  Pt states that the day before yesterday, she started noticing a clear gel-like discharge when she wiped.  Pt states that it was very light pink this morning.  Pt also endorses some lower abdominal/back cramping.

## 2024-09-03 ENCOUNTER — ROUTINE PRENATAL (OUTPATIENT)
Dept: OBGYN CLINIC | Age: 39
End: 2024-09-03
Payer: COMMERCIAL

## 2024-09-03 VITALS — BODY MASS INDEX: 25.97 KG/M2 | SYSTOLIC BLOOD PRESSURE: 114 MMHG | WEIGHT: 142 LBS | DIASTOLIC BLOOD PRESSURE: 70 MMHG

## 2024-09-03 DIAGNOSIS — O09.521 ADVANCED MATERNAL AGE IN MULTIGRAVIDA, FIRST TRIMESTER: ICD-10-CM

## 2024-09-03 DIAGNOSIS — Z11.51 SCREENING FOR HPV (HUMAN PAPILLOMAVIRUS): ICD-10-CM

## 2024-09-03 DIAGNOSIS — Z11.3 SCREEN FOR STD (SEXUALLY TRANSMITTED DISEASE): ICD-10-CM

## 2024-09-03 DIAGNOSIS — Z12.4 PAP SMEAR FOR CERVICAL CANCER SCREENING: ICD-10-CM

## 2024-09-03 DIAGNOSIS — F41.9 ANXIETY: ICD-10-CM

## 2024-09-03 DIAGNOSIS — O09.519 ENCOUNTER FOR SUPERVISION OF HIGH RISK PREGNANCY DUE TO ADVANCED MATERNAL AGE IN PRIMIGRAVIDA: Primary | ICD-10-CM

## 2024-09-03 PROBLEM — O02.1 MISSED ABORTION: Status: RESOLVED | Noted: 2023-10-25 | Resolved: 2024-09-03

## 2024-09-03 PROCEDURE — 99213 OFFICE O/P EST LOW 20 MIN: CPT | Performed by: OBSTETRICS & GYNECOLOGY

## 2024-09-03 PROCEDURE — 0501F PRENATAL FLOW SHEET: CPT | Performed by: OBSTETRICS & GYNECOLOGY

## 2024-09-03 NOTE — PROGRESS NOTES
-PN labs reviewed. Carrier screen pending    -Flu vaccine recommended    -Low risk  Genetics   -ASA   -RTO 4wks for CURTIS    - Anatomy scan with MFM due to AMA    2) Anxiety  - EPDS 5 today. Seems mostly situational. Will hold off on meds now  - Consider if sx worsen. Encouraged to start counseling   -   Anxiety and Depression  The approach to depression and anxiety in pregnancy must look at the whole maternal-child cohort to assess risks and benefits.  depression is associated with an increased risk of multiple poor obstetrical outcomes, including spontaneous , bleeding, operative deliveries, and  birth. In a nationally representative survey in the [place] that identified pregnant women with major depression, only 50 percent received treatment. Untreated disease causes maternal suffering and is associated with poor nutrition, comorbid substance use disorders, poor adherence with prenatal care, postpartum depression, impaired relationships between the mother and her infant and other family members, and an increased risk of suicide.    It is important to assess the benefit of previous treatment in order to guide treatment selection.  If psychotherapy is indicated and the patient was successfully treated with a particular psychotherapy prior to pregnancy, the same therapy is used during pregnancy.   Similarly, if pharmacotherapy is indicated and the patient was successfully treated with a particular antidepressant prior to pregnancy, the same drug is used during pregnancy.    The risks of untreated moderate to severe maternal major depression, to both the mother and fetus, often outweigh the risks associated with antidepressants.     A national registry study (nearly 1,300,000 births) compared infants who were exposed to SSRIs in early pregnancy (n >10,000) with infants not exposed. After adjusting for potential confounds (eg, maternal age, smoking, and body mass index), the

## 2024-09-06 LAB
C TRACH RRNA CVX QL NAA+PROBE: NEGATIVE
COLLECTION METHOD: NORMAL
CYTOLOGIST CVX/VAG CYTO: NORMAL
CYTOLOGY CVX/VAG DOC THIN PREP: NORMAL
HPV APTIMA: NEGATIVE
HPV GENOTYPE REFLEX: NORMAL
Lab: NORMAL
N GONORRHOEA RRNA CVX QL NAA+PROBE: NEGATIVE
OTHER PT INFO: NORMAL
PAP SOURCE: NORMAL
PATH REPORT.FINAL DX SPEC: NORMAL
PREV CYTO INFO: NEGATIVE
PREV TREATMENT RESULTS: NORMAL
PREV TREATMENT: NORMAL
STAT OF ADQ CVX/VAG CYTO-IMP: NORMAL
T VAGINALIS RRNA SPEC QL NAA+PROBE: NEGATIVE

## 2024-09-10 ENCOUNTER — TELEPHONE (OUTPATIENT)
Dept: OBGYN CLINIC | Age: 39
End: 2024-09-10

## 2024-09-30 ENCOUNTER — ROUTINE PRENATAL (OUTPATIENT)
Dept: OBGYN CLINIC | Age: 39
End: 2024-09-30

## 2024-09-30 VITALS — WEIGHT: 150 LBS | BODY MASS INDEX: 27.44 KG/M2 | SYSTOLIC BLOOD PRESSURE: 122 MMHG | DIASTOLIC BLOOD PRESSURE: 72 MMHG

## 2024-09-30 DIAGNOSIS — Z3A.17 17 WEEKS GESTATION OF PREGNANCY: ICD-10-CM

## 2024-09-30 DIAGNOSIS — O09.519 ENCOUNTER FOR SUPERVISION OF HIGH RISK PREGNANCY DUE TO ADVANCED MATERNAL AGE IN PRIMIGRAVIDA: Primary | ICD-10-CM

## 2024-09-30 DIAGNOSIS — F41.9 ANXIETY: ICD-10-CM

## 2024-09-30 PROCEDURE — 0502F SUBSEQUENT PRENATAL CARE: CPT | Performed by: NURSE PRACTITIONER

## 2024-09-30 NOTE — PATIENT INSTRUCTIONS
PTL/labor precautions, FMC, and pregnancy warning signs reviewed. Pt advised to call the office at [phone] or go straight to Labor and Delivery at [facility] [place] with any of the following concerns vaginal bleeding, leaking of fluid, cory regularly Q 5-7 minutes for over an hour.

## 2024-09-30 NOTE — PROGRESS NOTES
OB return  Murtaza Robles is a 39 y.o. female   with 17w5d IUP with Estimated Date of Delivery: 3/5/25 presenting to the clinic as a routine OB. Here today with her .     She reports continued nausea however, states able to keep po foods and fluids. Denies vomiting.   Denies feeling regular contractions, leakage of fluid or vaginal bleeding. States not feeling any fetal movement yet.    She reports tripping and falling onto her knees yesterday. States \"I was brining groceries in and tripped over a brace that was on the floor and fell onto my knees and braced myself with my hands. I did not fall on my belly at all.\" She denies feeling contractions, vaginal bleeding or leakage of fluid after fall.     Denies HA, blurred vision or RUQ pain.     ++Cardiac activity with gross fetal movement seen on portable u/s via ipad today.     Problem list reviewed and updated    Pregnancy complicated by:  1. Advanced maternal age: D=8, Low risk NIPT (Fetal blood type RH +), ASA. Sees [facility] 10/23.     2. Anxiety: Patient states mood stable without any medications. Does not wish to start any meds. Encouraged her to utilize [facility] Impact[facility] program if needed.     Physical Examination:  /72   Wt 68 kg (150 lb)   LMP 2024   BMI 27.44 kg/m²    Gen: AAOx3  Ab: soft, NTTP, gravid uterus  Skin: no edema    Plan:  --She reports tripping and falling onto her knees yesterday. States \"I was brining groceries in and tripped over a brace that was on the floor and fell onto my knees and braced myself with my hands. I did not fall on my belly at all.\" She denies feeling contractions, vaginal bleeding or leakage of fluid after fall.   --++Cardiac activity with gross fetal movement seen on portable u/s via ipad today. Reassurance given  --Encouraged increase in water intake daily to prevent dehydration.   --RTC 4 weeks for eugenio. Sees [facility] 10/23    PTL/labor precautions, FMC, and pregnancy warning signs reviewed. Pt advised to

## 2024-10-07 RX ORDER — PRENATAL VIT/IRON FUM/FOLIC AC 27MG-0.8MG
1 TABLET ORAL DAILY
Qty: 90 TABLET | Refills: 1 | Status: SHIPPED | OUTPATIENT
Start: 2024-10-07

## 2024-10-08 ENCOUNTER — APPOINTMENT (OUTPATIENT)
Dept: ULTRASOUND IMAGING | Age: 39
End: 2024-10-08
Payer: COMMERCIAL

## 2024-10-08 ENCOUNTER — HOSPITAL ENCOUNTER (EMERGENCY)
Age: 39
Discharge: HOME | End: 2024-10-08
Attending: EMERGENCY MEDICINE
Payer: COMMERCIAL

## 2024-10-08 VITALS
HEART RATE: 90 BPM | DIASTOLIC BLOOD PRESSURE: 60 MMHG | WEIGHT: 150 LBS | SYSTOLIC BLOOD PRESSURE: 100 MMHG | HEIGHT: 62 IN | TEMPERATURE: 97.4 F | OXYGEN SATURATION: 98 % | RESPIRATION RATE: 16 BRPM | BODY MASS INDEX: 27.6 KG/M2

## 2024-10-08 DIAGNOSIS — R10.11 ABDOMINAL PAIN, RIGHT UPPER QUADRANT: Primary | ICD-10-CM

## 2024-10-08 LAB
ALBUMIN SERPL-MCNC: 3.3 G/DL (ref 3.5–5)
ALBUMIN/GLOB SERPL: 0.9 (ref 1–1.9)
ALP SERPL-CCNC: 60 U/L (ref 35–104)
ALT SERPL-CCNC: 19 U/L (ref 8–45)
ANION GAP SERPL CALC-SCNC: 11 MMOL/L (ref 9–18)
APPEARANCE UR: CLEAR
AST SERPL-CCNC: 38 U/L (ref 15–37)
BASOPHILS # BLD: 0 K/UL (ref 0–0.2)
BASOPHILS NFR BLD: 0 % (ref 0–2)
BILIRUB SERPL-MCNC: 0.2 MG/DL (ref 0–1.2)
BILIRUB UR QL: NEGATIVE
BUN SERPL-MCNC: 5 MG/DL (ref 6–23)
CALCIUM SERPL-MCNC: 9.2 MG/DL (ref 8.8–10.2)
CHLORIDE SERPL-SCNC: 101 MMOL/L (ref 98–107)
CO2 SERPL-SCNC: 22 MMOL/L (ref 20–28)
COLOR UR: NORMAL
CREAT SERPL-MCNC: 0.51 MG/DL (ref 0.6–1.1)
DIFFERENTIAL METHOD BLD: ABNORMAL
EOSINOPHIL # BLD: 0 K/UL (ref 0–0.8)
EOSINOPHIL NFR BLD: 0 % (ref 0.5–7.8)
ERYTHROCYTE [DISTWIDTH] IN BLOOD BY AUTOMATED COUNT: 13.3 % (ref 11.9–14.6)
GLOBULIN SER CALC-MCNC: 3.8 G/DL (ref 2.3–3.5)
GLUCOSE SERPL-MCNC: 95 MG/DL (ref 70–99)
GLUCOSE UR STRIP.AUTO-MCNC: NEGATIVE MG/DL
HCT VFR BLD AUTO: 34.5 % (ref 35.8–46.3)
HGB BLD-MCNC: 11.7 G/DL (ref 11.7–15.4)
HGB UR QL STRIP: NEGATIVE
IMM GRANULOCYTES # BLD AUTO: 0.1 K/UL (ref 0–0.5)
IMM GRANULOCYTES NFR BLD AUTO: 1 % (ref 0–5)
KETONES UR QL STRIP.AUTO: NEGATIVE MG/DL
LEUKOCYTE ESTERASE UR QL STRIP.AUTO: NEGATIVE
LIPASE SERPL-CCNC: 38 U/L (ref 13–60)
LYMPHOCYTES # BLD: 1.3 K/UL (ref 0.5–4.6)
LYMPHOCYTES NFR BLD: 11 % (ref 13–44)
MCH RBC QN AUTO: 31.3 PG (ref 26.1–32.9)
MCHC RBC AUTO-ENTMCNC: 33.9 G/DL (ref 31.4–35)
MCV RBC AUTO: 92.2 FL (ref 82–102)
MONOCYTES # BLD: 0.4 K/UL (ref 0.1–1.3)
MONOCYTES NFR BLD: 4 % (ref 4–12)
NEUTS SEG # BLD: 9.9 K/UL (ref 1.7–8.2)
NEUTS SEG NFR BLD: 84 % (ref 43–78)
NITRITE UR QL STRIP.AUTO: NEGATIVE
NRBC # BLD: 0 K/UL (ref 0–0.2)
PH UR STRIP: 7 (ref 5–9)
PLATELET # BLD AUTO: 294 K/UL (ref 150–450)
PMV BLD AUTO: 10.5 FL (ref 9.4–12.3)
POTASSIUM SERPL-SCNC: 3.6 MMOL/L (ref 3.5–5.1)
PROT SERPL-MCNC: ABNORMAL G/DL (ref 6.3–8.2)
PROT UR STRIP-MCNC: NEGATIVE MG/DL
RBC # BLD AUTO: 3.74 M/UL (ref 4.05–5.2)
SODIUM SERPL-SCNC: 134 MMOL/L (ref 136–145)
SP GR UR REFRACTOMETRY: 1.01 (ref 1–1.02)
UROBILINOGEN UR QL STRIP.AUTO: 0.2 EU/DL (ref 0.2–1)
WBC # BLD AUTO: 11.8 K/UL (ref 4.3–11.1)

## 2024-10-08 PROCEDURE — 76705 ECHO EXAM OF ABDOMEN: CPT

## 2024-10-08 PROCEDURE — 99284 EMERGENCY DEPT VISIT MOD MDM: CPT

## 2024-10-08 PROCEDURE — 85025 COMPLETE CBC W/AUTO DIFF WBC: CPT

## 2024-10-08 PROCEDURE — 81003 URINALYSIS AUTO W/O SCOPE: CPT

## 2024-10-08 PROCEDURE — 83690 ASSAY OF LIPASE: CPT

## 2024-10-08 PROCEDURE — 80053 COMPREHEN METABOLIC PANEL: CPT

## 2024-10-08 ASSESSMENT — PAIN SCALES - GENERAL
PAINLEVEL_OUTOF10: 0
PAINLEVEL_OUTOF10: 1

## 2024-10-08 ASSESSMENT — ENCOUNTER SYMPTOMS
COUGH: 0
RHINORRHEA: 0
SHORTNESS OF BREATH: 0
NAUSEA: 1

## 2024-10-08 ASSESSMENT — PAIN DESCRIPTION - ORIENTATION: ORIENTATION: RIGHT;UPPER

## 2024-10-08 ASSESSMENT — LIFESTYLE VARIABLES
HOW MANY STANDARD DRINKS CONTAINING ALCOHOL DO YOU HAVE ON A TYPICAL DAY: PATIENT DOES NOT DRINK
HOW OFTEN DO YOU HAVE A DRINK CONTAINING ALCOHOL: NEVER

## 2024-10-08 ASSESSMENT — PAIN DESCRIPTION - LOCATION: LOCATION: ABDOMEN

## 2024-10-08 ASSESSMENT — PAIN - FUNCTIONAL ASSESSMENT: PAIN_FUNCTIONAL_ASSESSMENT: 0-10

## 2024-10-08 NOTE — DISCHARGE INSTRUCTIONS
Eat a low fat diet to make it through pregnancy and delivery and follow up with GI to have your gallbladder evaluated.

## 2024-10-08 NOTE — ED TRIAGE NOTES
Patient report right rib/ RUQ pain for about the month to 2 months. Patient is 19 weeks pregnant.

## 2024-10-08 NOTE — ED PROVIDER NOTES
Emergency Department Provider Signout / Continuation of Care Note         DISPOSITION Decision To Discharge 10/08/2024 06:06:01 PM       ICD-10-CM    1. Abdominal pain, right upper quadrant  R10.11           The patient's care was signed out to me at shift change.      Final Plan      Patient with right upper quadrant abdominal pain.  Seems to be related to eating.  No acute on right upper quadrant ultrasound.  No gallbladder disease.  Will encourage patient to eat a low-fat diet to make it through the rest of her pregnancy and delivery and then have GI follow-up for her gallbladder evaluation.       1 or more acute illnesses that pose a threat to life or bodily function.   Patient was discharged risks and benefits of hospitalization were considered.  Shared medical decision making was utilized in creating the patients health plan today.       I interpreted the Ultrasound  no gall stones.                 Reid Schwarz III, MD  10/08/24 1462    
  Result Value Ref Range    Lipase 38 13 - 60 U/L   Comprehensive Metabolic Panel   Result Value Ref Range    Sodium 134 (L) 136 - 145 mmol/L    Potassium 3.6 3.5 - 5.1 mmol/L    Chloride 101 98 - 107 mmol/L    CO2 22 20 - 28 mmol/L    Anion Gap 11 9 - 18 mmol/L    Glucose 95 70 - 99 mg/dL    BUN 5 (L) 6 - 23 MG/DL    Creatinine 0.51 (L) 0.60 - 1.10 MG/DL    Est, Glom Filt Rate >90 >60 ml/min/1.73m2    Calcium 9.2 8.8 - 10.2 MG/DL    Total Bilirubin 0.2 0.0 - 1.2 MG/DL    ALT 19 8 - 45 U/L    AST 38 (H) 15 - 37 U/L    Alk Phosphatase 60 35 - 104 U/L    Total Protein INTERPRET RESULTS WITH CAUTION, SPECIMEN HEMOLYZED 6.3 - 8.2 g/dL    Albumin 3.3 (L) 3.5 - 5.0 g/dL    Globulin 3.8 (H) 2.3 - 3.5 g/dL    Albumin/Globulin Ratio 0.9 (L) 1.0 - 1.9           US ABDOMEN LIMITED Specify organ? GALLBLADDER   Final Result   Mildly prominent common duct which may be related to patient's   pregnancy. Otherwise unremarkable.         Electronically signed by Hang Powers                   No results for input(s): \"COVID19\" in the last 72 hours.    Voice dictation software was used during the making of this note.  This software is not perfect and grammatical and other typographical errors may be present.  This note has not been completely proofread for errors.        Cachorro Hyde MD  10/10/24 6299

## 2024-10-09 ENCOUNTER — TELEPHONE (OUTPATIENT)
Dept: OBGYN CLINIC | Age: 39
End: 2024-10-09

## 2024-10-09 NOTE — TELEPHONE ENCOUNTER
Patient left a voicemail on OB line stating that she has been having right rib pain after she eats for weeks and went to the ED yesterday. At the ED was told that her gallbladder is fine and they were unable to find a cause for the pain.   LVM asking patient for return call. Modalityt message sent as well.

## 2024-10-12 ENCOUNTER — HOSPITAL ENCOUNTER (OUTPATIENT)
Age: 39
Discharge: HOME | End: 2024-10-12
Attending: OBSTETRICS & GYNECOLOGY | Admitting: OBSTETRICS & GYNECOLOGY
Payer: COMMERCIAL

## 2024-10-12 VITALS
SYSTOLIC BLOOD PRESSURE: 122 MMHG | TEMPERATURE: 98.2 F | DIASTOLIC BLOOD PRESSURE: 80 MMHG | RESPIRATION RATE: 17 BRPM | HEART RATE: 93 BPM

## 2024-10-12 PROBLEM — N93.9 VAGINAL SPOTTING: Status: ACTIVE | Noted: 2024-10-12

## 2024-10-12 PROBLEM — N93.9 VAGINAL SPOTTING: Status: RESOLVED | Noted: 2024-10-12 | Resolved: 2024-10-12

## 2024-10-12 PROCEDURE — 99283 EMERGENCY DEPT VISIT LOW MDM: CPT

## 2024-10-12 NOTE — PROGRESS NOTES
OB ED     Pt to [facility] with complaints of vaginal bleeding.  Pt showed me a picture it was approx 1/2 inch long thin line of pinkish red blood on paper. No active bleeding now. Pt states that she had sex day before yesterday. Abd palpated soft. Positive fetal movement  Urine dip negative. Pt reassured and   with doppler.  denies LOF  [facility] notified of patient's arrival.

## 2024-10-12 NOTE — H&P
Obstetrics History & Physical/OB ED note    Name: Murtaza Robles MRN: [id number]     YOB: 1985  Age: 39 y.o.  Sex: female      Reason for Presentation:  vaginal spotting    HPI: Murtaza Robles is a 39 y.o.  female with Estimated Date of Delivery: 3/5/25 at 19w3d gestation. Her obstetrical history is significant for uncomplicated pregnancy.  Prenatal records reviewed.     Had one episode of spotting with wiping today. It was associated with a little bit of cramping.      She denies leakage of fluid. She denies contractions.     Past History:  OB History    Para Term  AB Living   2       1 0   SAB IAB Ectopic Molar Multiple Live Births   1         0      # Outcome Date GA Lbr Jonny/2nd Weight Sex Type Anes PTL Lv   2 Current            1 SAB 10/2023             Past Medical History:   Diagnosis Date    MRSA (methicillin resistant Staphylococcus aureus)      Past Surgical History:   Procedure Laterality Date    DILATION AND CURETTAGE OF UTERUS N/A 10/25/2023    DILATATION AND CURETTAGE SUCTION performed by Breanna Del Valle MD at [facility] [place] MAIN OR     Social History     Tobacco Use    Smoking status: Never    Smokeless tobacco: Never   Substance Use Topics    Alcohol use: Not Currently     Prior to Admission medications    Medication Sig Start Date End Date Taking? Authorizing Provider   Prenatal Vit-Fe Fumarate-FA (PRENATAL VITAMIN) 27-0.8 MG TABS TAKE 1 TABLET BY MOUTH EVERY DAY 10/7/24   Breanna Mac DO   aspirin 81 MG EC tablet Take 2 tablets by mouth daily 24   Breanna Mac DO   Calcium Carbonate-Vitamin D (OSCAL 500 D-3 PO) Take 600 mg by mouth 2 times daily (with meals)    Everardo Valdez MD   Ascorbic Acid (VITAMIN C) 250 MG tablet Take 1 tablet by mouth daily    Everardo Valdez MD   pyridoxine (B-6) 25 MG tablet Take 1 tablet by mouth daily    Everardo Valdez MD   aspirin 81 MG EC tablet Take 2 tablets by mouth daily 24   Breanna Mac  L, DO     Allergies   Allergen Reactions    Hydrocodone Shortness Of Breath    Pcn [Penicillins] Angioedema       Physical Exam:  VITALS:  /80   Pulse 93   Temp 98.2 °F (36.8 °C) (Oral)   Resp 17   LMP 05/29/2024     CONSTITUTIONAL:  awake, alert, cooperative, no apparent distress, and appears stated age  ABDOMEN:  non-tender  FHTs: Reassuring/appropriate for gestational age by [place];   Uterine activity/Contractions on monitor: None  Membranes: intact  Cervix: closed/thick/high; no blood on glove      Assessment:  19w3d gestation  No signs of labor or abruption  Reassuring fetal status    Plan: Discharge home, follow-up at next OB appointment

## 2024-10-16 PROBLEM — O09.512 HIGH-RISK PREGNANCY, PRIMIGRAVIDA OF ADVANCED MATERNAL AGE IN SECOND TRIMESTER: Status: ACTIVE | Noted: 2024-09-03

## 2024-10-16 PROBLEM — O99.342 ANXIETY DURING PREGNANCY, ANTEPARTUM, SECOND TRIMESTER: Status: ACTIVE | Noted: 2024-09-03

## 2024-10-23 ENCOUNTER — ROUTINE PRENATAL (OUTPATIENT)
Dept: OBGYN CLINIC | Age: 39
End: 2024-10-23
Payer: COMMERCIAL

## 2024-10-23 ENCOUNTER — TELEPHONE (OUTPATIENT)
Dept: OBGYN CLINIC | Age: 39
End: 2024-10-23

## 2024-10-23 VITALS — DIASTOLIC BLOOD PRESSURE: 60 MMHG | SYSTOLIC BLOOD PRESSURE: 104 MMHG

## 2024-10-23 DIAGNOSIS — O99.342 ANXIETY DURING PREGNANCY, ANTEPARTUM, SECOND TRIMESTER: ICD-10-CM

## 2024-10-23 DIAGNOSIS — O26.892 RH NEGATIVE STATUS DURING PREGNANCY IN SECOND TRIMESTER: ICD-10-CM

## 2024-10-23 DIAGNOSIS — F41.9 ANXIETY DURING PREGNANCY, ANTEPARTUM, SECOND TRIMESTER: ICD-10-CM

## 2024-10-23 DIAGNOSIS — O09.512 HIGH-RISK PREGNANCY, PRIMIGRAVIDA OF ADVANCED MATERNAL AGE IN SECOND TRIMESTER: Primary | ICD-10-CM

## 2024-10-23 DIAGNOSIS — Z3A.21 21 WEEKS GESTATION OF PREGNANCY: ICD-10-CM

## 2024-10-23 DIAGNOSIS — Z67.91 RH NEGATIVE STATUS DURING PREGNANCY IN SECOND TRIMESTER: ICD-10-CM

## 2024-10-23 PROCEDURE — 76811 OB US DETAILED SNGL FETUS: CPT | Performed by: OBSTETRICS & GYNECOLOGY

## 2024-10-23 PROCEDURE — 99204 OFFICE O/P NEW MOD 45 MIN: CPT | Performed by: OBSTETRICS & GYNECOLOGY

## 2024-10-23 PROCEDURE — 76827 ECHO EXAM OF FETAL HEART: CPT | Performed by: OBSTETRICS & GYNECOLOGY

## 2024-10-23 PROCEDURE — 93325 DOPPLER ECHO COLOR FLOW MAPG: CPT | Performed by: OBSTETRICS & GYNECOLOGY

## 2024-10-23 PROCEDURE — 76825 ECHO EXAM OF FETAL HEART: CPT | Performed by: OBSTETRICS & GYNECOLOGY

## 2024-10-23 ASSESSMENT — PATIENT HEALTH QUESTIONNAIRE - PHQ9
1. LITTLE INTEREST OR PLEASURE IN DOING THINGS: NOT AT ALL
SUM OF ALL RESPONSES TO PHQ QUESTIONS 1-9: 0
SUM OF ALL RESPONSES TO PHQ QUESTIONS 1-9: 0
2. FEELING DOWN, DEPRESSED OR HOPELESS: NOT AT ALL
SUM OF ALL RESPONSES TO PHQ9 QUESTIONS 1 & 2: 0
SUM OF ALL RESPONSES TO PHQ QUESTIONS 1-9: 0
SUM OF ALL RESPONSES TO PHQ QUESTIONS 1-9: 0

## 2024-10-23 NOTE — PROGRESS NOTES
in ultrasound report. Limited ultrasound data included in office consult note.    Additional plans and concerns as documented in problem list.   All questions answered and concerns discussed.    Return if symptoms worsen or fail to improve.   Jackie Stallings MD   An electronic signature was used to authenticate this note.    I have spent 46 minutes reviewing previous notes, test results and face to face with the patient discussing the diagnosis and importance of compliance with the treatment plan, as well as documenting on the day of the visit 10/23/2024. Normal ultrasound findings are not included in this time calculation.

## 2024-10-23 NOTE — ASSESSMENT & PLAN NOTE
Anxiety and Depression  The approach to depression and anxiety in pregnancy must look at the whole maternal-child cohort to assess risks and benefits.  depression is associated with an increased risk of multiple poor obstetrical outcomes, including spontaneous , bleeding, operative deliveries, and  birth. In a nationally representative survey in the [place] that identified pregnant women with major depression, only 50 percent received treatment. Untreated disease causes maternal suffering and is associated with poor nutrition, comorbid substance use disorders, poor adherence with prenatal care, postpartum depression, impaired relationships between the mother and her infant and other family members, and an increased risk of suicide.    It is important to assess the benefit of previous treatment in order to guide treatment selection.  If psychotherapy is indicated and the patient was successfully treated with a particular psychotherapy prior to pregnancy, the same therapy is used during pregnancy.   Similarly, if pharmacotherapy is indicated and the patient was successfully treated with a particular antidepressant prior to pregnancy, the same drug is used during pregnancy.    The risks of untreated moderate to severe maternal major depression, to both the mother and fetus, often outweigh the risks associated with antidepressants.     A national registry study (nearly 1,300,000 births) compared infants who were exposed to SSRIs in early pregnancy (n >10,000) with infants not exposed. After adjusting for potential confounds (eg, maternal age, smoking, and body mass index), the analyses found that the risk of severe congenital malformations was comparable in the two groups.    Antidepressant drug doses may need to be increased as the pregnancy progresses, especially during the third trimester. There is no evidence that tapering or discontinuing antidepressants at term reduces the 
black race, or who have additional risk factors for stillbirth such as obesity.   Women who decline induction should undergo  testing and daily kick counts until spontaneous labor, nonreassuring testing, or 41 weeks of gestation.

## 2024-10-23 NOTE — PATIENT INSTRUCTIONS
Your Maternity Check List   Before Arriving to the Hospital     Find an OBGYN Doctor: [email or web address]  Maternity Pre-registration for Hospital: [email or web address]  Sign up for a Hospital Tour: [email or web address]/about-us/classes-events  [place] for Prenatal Classes: [email or web address]/about-us/classes-events   Car seat Safety Check: [email or web address]   Learn More: www.[email or web address]/health-care-services/womens-health/maternity   Download the [employer] Pregnancy Robby: (Scan QR Code below):  See educational videos, track your pregnancy, and Mom/Baby Care videos          Resources for Depression/Anxiety  Postpartum Support International (PSI).    PSI Warmline:  3-018-494-4PPD (4352).  WWW.POSTPARTUM.NET    Mom's IMPACTT  [email or web address]-health/[email or web address]-[email or web address]       In order to optimize maternal, fetal, and  health, we recommend the following vaccinations.   Flu- yearly (https://www.highriskpregnancyinfo.org/flu-facts-for-pregnancy)  Consider Covid vaccination/booster (https://www.highriskpregnancyinfo.org/covid-19-pregnancy)  TDaP after 28 weeks each pregnancy (https://www.highriskpregnancyinfo.org/tdap)  Consider RSV vaccine 32-36 weeks of pregnancy, if Sept- January.  (https://www.highriskpregnancyinfo.org/rsv)            Good sleep habits (sometimes referred to as “sleep hygiene”) can help you get a good night’s sleep.  Some habits that can improve your sleep health:  Be consistent. Go to bed at the same time each night and get up at the same time each morning, including on the weekends  Make sure your bedroom is quiet, dark, relaxing, and at a comfortable temperature  Remove electronic devices, such as TVs, computers, and smart phones, from the bedroom  Avoid large meals, caffeine, and alcohol before bedtime  Get some exercise. Being physically active during

## 2024-10-29 ENCOUNTER — ROUTINE PRENATAL (OUTPATIENT)
Dept: OBGYN CLINIC | Age: 39
End: 2024-10-29

## 2024-10-29 VITALS — DIASTOLIC BLOOD PRESSURE: 72 MMHG | SYSTOLIC BLOOD PRESSURE: 116 MMHG | WEIGHT: 156 LBS | BODY MASS INDEX: 28.53 KG/M2

## 2024-10-29 DIAGNOSIS — O26.892 RH NEGATIVE STATUS DURING PREGNANCY IN SECOND TRIMESTER: ICD-10-CM

## 2024-10-29 DIAGNOSIS — O09.512 HIGH-RISK PREGNANCY, PRIMIGRAVIDA OF ADVANCED MATERNAL AGE IN SECOND TRIMESTER: Primary | ICD-10-CM

## 2024-10-29 DIAGNOSIS — Z67.91 RH NEGATIVE STATUS DURING PREGNANCY IN SECOND TRIMESTER: ICD-10-CM

## 2024-10-29 DIAGNOSIS — O99.342 ANXIETY DURING PREGNANCY, ANTEPARTUM, SECOND TRIMESTER: ICD-10-CM

## 2024-10-29 DIAGNOSIS — F41.9 ANXIETY DURING PREGNANCY, ANTEPARTUM, SECOND TRIMESTER: ICD-10-CM

## 2024-10-29 PROCEDURE — 0502F SUBSEQUENT PRENATAL CARE: CPT | Performed by: OBSTETRICS & GYNECOLOGY

## 2024-10-29 NOTE — PROGRESS NOTES
CURTIS. .  21w6d   Denies LOF, VB, Ctxs.  Good FM.      Vitals:    10/29/24 1004   BP: 116/72        General: well appearing NAD  Psych: normal affect  Abdomen: gravid   LE: no edema    High-risk pregnancy, primigravida of advanced maternal age in second trimester  Reassuring anatomy with MFM. Growth recommended with us 28-32 weeks (scheduled)   Flu vaccine recommended        Breanna Mac DO

## 2024-11-25 ENCOUNTER — ROUTINE PRENATAL (OUTPATIENT)
Dept: OBGYN CLINIC | Age: 39
End: 2024-11-25

## 2024-11-25 VITALS — DIASTOLIC BLOOD PRESSURE: 68 MMHG | SYSTOLIC BLOOD PRESSURE: 120 MMHG | BODY MASS INDEX: 30 KG/M2 | WEIGHT: 164 LBS

## 2024-11-25 DIAGNOSIS — Z3A.25 25 WEEKS GESTATION OF PREGNANCY: ICD-10-CM

## 2024-11-25 DIAGNOSIS — Z13.0 SCREENING FOR DEFICIENCY ANEMIA: ICD-10-CM

## 2024-11-25 DIAGNOSIS — O26.892 RH NEGATIVE STATUS DURING PREGNANCY IN SECOND TRIMESTER: ICD-10-CM

## 2024-11-25 DIAGNOSIS — Z13.1 SCREENING FOR DIABETES MELLITUS: ICD-10-CM

## 2024-11-25 DIAGNOSIS — O09.512 HIGH-RISK PREGNANCY, PRIMIGRAVIDA OF ADVANCED MATERNAL AGE IN SECOND TRIMESTER: Primary | ICD-10-CM

## 2024-11-25 DIAGNOSIS — Z67.91 RH NEGATIVE STATUS DURING PREGNANCY IN SECOND TRIMESTER: ICD-10-CM

## 2024-11-25 PROCEDURE — 0502F SUBSEQUENT PRENATAL CARE: CPT | Performed by: OBSTETRICS & GYNECOLOGY

## 2024-11-25 SDOH — ECONOMIC STABILITY: FOOD INSECURITY: WITHIN THE PAST 12 MONTHS, YOU WORRIED THAT YOUR FOOD WOULD RUN OUT BEFORE YOU GOT MONEY TO BUY MORE.: NEVER TRUE

## 2024-11-25 SDOH — ECONOMIC STABILITY: FOOD INSECURITY: WITHIN THE PAST 12 MONTHS, THE FOOD YOU BOUGHT JUST DIDN'T LAST AND YOU DIDN'T HAVE MONEY TO GET MORE.: NEVER TRUE

## 2024-11-25 SDOH — ECONOMIC STABILITY: INCOME INSECURITY: HOW HARD IS IT FOR YOU TO PAY FOR THE VERY BASICS LIKE FOOD, HOUSING, MEDICAL CARE, AND HEATING?: NOT VERY HARD

## 2024-11-25 NOTE — PROGRESS NOTES
Murtaza  presents for CURTIS. . 25w5d.    PL and any MFM notes reviewed.. Med list reviewed.  Taking Prenatal Vitamins: Yes  She is noticing:  no unusual complaints  +FM-has anterior placenta  Saw mfm- all ok  Growth again here on 12-20  Pt is rh neg  Baby is rh pos  Pt ok with coming for labs and rhogam the wk prior so she will get rhogam by 29wks. Future orders placed  Has gained 28#. We discussed eliminating cereal.     See OB VS for today's vitals, FHR, fundal height and presentation.    The following were addressed today:  Murtaza was seen today for routine prenatal visit.    Diagnoses and all orders for this visit:    High-risk pregnancy, primigravida of advanced maternal age in second trimester    Rh negative status during pregnancy in second trimester  -     Antibody Screen; Future    25 weeks gestation of pregnancy    Screening for deficiency anemia  -     CBC; Future    Screening for diabetes mellitus  -     Glucose tolerance, 1 hour only, single test; Future

## 2024-12-09 RX ORDER — PRENATAL VIT/IRON FUM/FOLIC AC 27MG-0.8MG
1 TABLET ORAL DAILY
Qty: 90 TABLET | Refills: 1 | Status: SHIPPED | OUTPATIENT
Start: 2024-12-09

## 2024-12-12 ENCOUNTER — NURSE ONLY (OUTPATIENT)
Dept: OBGYN CLINIC | Age: 39
End: 2024-12-12
Payer: COMMERCIAL

## 2024-12-12 DIAGNOSIS — O26.893 RH NEGATIVE STATUS DURING PREGNANCY IN THIRD TRIMESTER: Primary | ICD-10-CM

## 2024-12-12 DIAGNOSIS — Z67.91 RH NEGATIVE STATUS DURING PREGNANCY IN THIRD TRIMESTER: Primary | ICD-10-CM

## 2024-12-12 PROCEDURE — 96372 THER/PROPH/DIAG INJ SC/IM: CPT | Performed by: OBSTETRICS & GYNECOLOGY

## 2024-12-20 ENCOUNTER — PROCEDURE VISIT (OUTPATIENT)
Dept: OBGYN CLINIC | Age: 39
End: 2024-12-20

## 2024-12-20 ENCOUNTER — ROUTINE PRENATAL (OUTPATIENT)
Dept: OBGYN CLINIC | Age: 39
End: 2024-12-20

## 2024-12-20 VITALS — DIASTOLIC BLOOD PRESSURE: 66 MMHG | WEIGHT: 174 LBS | SYSTOLIC BLOOD PRESSURE: 110 MMHG | BODY MASS INDEX: 31.83 KG/M2

## 2024-12-20 DIAGNOSIS — O26.893 RH NEGATIVE STATUS DURING PREGNANCY IN THIRD TRIMESTER: Primary | ICD-10-CM

## 2024-12-20 DIAGNOSIS — O09.512 HIGH-RISK PREGNANCY, PRIMIGRAVIDA OF ADVANCED MATERNAL AGE IN SECOND TRIMESTER: Primary | ICD-10-CM

## 2024-12-20 DIAGNOSIS — O99.342 ANXIETY DURING PREGNANCY, ANTEPARTUM, SECOND TRIMESTER: ICD-10-CM

## 2024-12-20 DIAGNOSIS — Z67.91 RH NEGATIVE STATUS DURING PREGNANCY IN THIRD TRIMESTER: Primary | ICD-10-CM

## 2024-12-20 DIAGNOSIS — Z67.91 RH NEGATIVE STATUS DURING PREGNANCY IN SECOND TRIMESTER: ICD-10-CM

## 2024-12-20 DIAGNOSIS — O09.512 HIGH-RISK PREGNANCY, PRIMIGRAVIDA OF ADVANCED MATERNAL AGE IN SECOND TRIMESTER: ICD-10-CM

## 2024-12-20 DIAGNOSIS — F41.9 ANXIETY DURING PREGNANCY, ANTEPARTUM, SECOND TRIMESTER: ICD-10-CM

## 2024-12-20 DIAGNOSIS — O26.892 RH NEGATIVE STATUS DURING PREGNANCY IN SECOND TRIMESTER: ICD-10-CM

## 2024-12-20 PROCEDURE — 0502F SUBSEQUENT PRENATAL CARE: CPT | Performed by: OBSTETRICS & GYNECOLOGY

## 2024-12-20 NOTE — ASSESSMENT & PLAN NOTE
Passed glucola  Growth today appropriate AC 75, EFW 63, normal APOORVA. Continue serial growth US due to AMA.

## 2024-12-20 NOTE — PROGRESS NOTES
CURTIS. .  29w2d   Denies LOF, VB, Ctxs.  Good FM.      Vitals:    24 1047   BP: 110/66        General: well appearing NAD  Psych: normal affect  Abdomen: gravid   LE: no edema    High-risk pregnancy, primigravida of advanced maternal age in second trimester  Passed glucola  Growth today appropriate AC 75, EFW 63, normal APOORVA. Continue serial growth US due to AMA.   Kick counts and labor precautions reviewed      Breanna Mac DO

## 2024-12-23 ENCOUNTER — TELEPHONE (OUTPATIENT)
Dept: OBGYN CLINIC | Age: 39
End: 2024-12-23

## 2024-12-23 NOTE — TELEPHONE ENCOUNTER
Scheduled.   [Subsequent Evaluation] : a subsequent evaluation for [Ear Infections] : ear infections [Mother] : mother

## 2025-01-08 ENCOUNTER — ROUTINE PRENATAL (OUTPATIENT)
Dept: OBGYN CLINIC | Age: 40
End: 2025-01-08

## 2025-01-08 VITALS — BODY MASS INDEX: 33.11 KG/M2 | WEIGHT: 181 LBS | DIASTOLIC BLOOD PRESSURE: 62 MMHG | SYSTOLIC BLOOD PRESSURE: 108 MMHG

## 2025-01-08 DIAGNOSIS — F41.9 ANXIETY DURING PREGNANCY IN THIRD TRIMESTER, ANTEPARTUM: ICD-10-CM

## 2025-01-08 DIAGNOSIS — Z67.91 RH NEGATIVE STATUS DURING PREGNANCY IN THIRD TRIMESTER: ICD-10-CM

## 2025-01-08 DIAGNOSIS — O26.813 FATIGUE DURING PREGNANCY IN THIRD TRIMESTER: ICD-10-CM

## 2025-01-08 DIAGNOSIS — O26.893 RH NEGATIVE STATUS DURING PREGNANCY IN THIRD TRIMESTER: ICD-10-CM

## 2025-01-08 DIAGNOSIS — O09.513 HIGH-RISK PREGNANCY, PRIMIGRAVIDA OF ADVANCED MATERNAL AGE IN THIRD TRIMESTER: Primary | ICD-10-CM

## 2025-01-08 DIAGNOSIS — O99.343 ANXIETY DURING PREGNANCY IN THIRD TRIMESTER, ANTEPARTUM: ICD-10-CM

## 2025-01-08 DIAGNOSIS — O99.213 OBESITY AFFECTING PREGNANCY IN THIRD TRIMESTER, UNSPECIFIED OBESITY TYPE: ICD-10-CM

## 2025-01-08 DIAGNOSIS — Z3A.32 32 WEEKS GESTATION OF PREGNANCY: ICD-10-CM

## 2025-01-08 LAB
ERYTHROCYTE [DISTWIDTH] IN BLOOD BY AUTOMATED COUNT: 13.2 % (ref 11.9–14.6)
HCT VFR BLD AUTO: 35.8 % (ref 35.8–46.3)
HGB BLD-MCNC: 11.9 G/DL (ref 11.7–15.4)
MCH RBC QN AUTO: 31.6 PG (ref 26.1–32.9)
MCHC RBC AUTO-ENTMCNC: 33.2 G/DL (ref 31.4–35)
MCV RBC AUTO: 95 FL (ref 82–102)
NRBC # BLD: 0 K/UL (ref 0–0.2)
PLATELET # BLD AUTO: 258 K/UL (ref 150–450)
PMV BLD AUTO: 10.1 FL (ref 9.4–12.3)
RBC # BLD AUTO: 3.77 M/UL (ref 4.05–5.2)
WBC # BLD AUTO: 13.6 K/UL (ref 4.3–11.1)

## 2025-01-08 PROCEDURE — 0501F PRENATAL FLOW SHEET: CPT | Performed by: NURSE PRACTITIONER

## 2025-01-08 NOTE — PATIENT INSTRUCTIONS
PTL/labor precautions, FMC, and pregnancy warning signs reviewed. Pt advised to call the office at [phone] or go straight to Labor and Delivery at [facility] [place] with any of the following concerns vaginal bleeding, leaking of fluid, cory regularly Q 5-7 minutes for over an hour or not feeling the baby move.     Kick counts and pre-term labor precautions reviewed

## 2025-01-08 NOTE — PROGRESS NOTES
OB return  Murtaza Robles is a 39 y.o. female   with 32w0d IUP with Estimated Date of Delivery: 3/5/25 presenting to the clinic as a routine OB.     She reports experiencing occasional [place] contractions however, feeling them regularly. She denies leakage of fluid or vaginal bleeding and reports active fetal movement.     Denies HA, blurred vision or RUQ pain.     FHTs: 130s    Problem list reviewed and updated    Pregnancy complicated by:  1. Advanced maternal age: D=8, Low risk NIPT (Fetal blood type RH +), ASA. 10/23/2024 at [facility]: Normal anatomy/echo, AC 50%, APOORVA WNL, Negative NIPT. For full details review formal ultrasound report.  Repeat growth in 28-32 weeks at Primary OB. OB cc'd.    2. Rh Negative: O Neg, s/p Rhogam     3. Anxiety: Patient states mood stable without any medications. Does not wish to start any meds. Encouraged her to utilize [facility] program if needed.     Physical Examination:  /62   Wt 82.1 kg (181 lb)   LMP 2024   BMI 33.11 kg/m²    Gen: AAOx3  Ab: soft, NTTP, gravid uterus  Skin: no edema    Plan:  --Patient states she has increase in fatigue throughout pregnancy however, feels as though this has increased. Would like her iron level checked today to ensure wnl.   --CBC collected today  --1 hour gtt passed-128, Hgb: 11.8 collected on . Discussed results with patient today.   --Encouraged increase in water intake daily to prevent dehydration.   --RTC 2 weeks for eugenio with growth u/s as scheduled.     PTL/labor precautions, FMC, and pregnancy warning signs reviewed. Pt advised to call the office at [phone] or go straight to Labor and Delivery at [facility] with any of the following concerns vaginal bleeding, leaking of fluid, cory regularly Q 5-7 minutes for over an hour or not feeling the baby move.     Kick counts and pre-term labor precautions reviewed

## 2025-01-23 ENCOUNTER — PROCEDURE VISIT (OUTPATIENT)
Dept: OBGYN CLINIC | Age: 40
End: 2025-01-23
Payer: COMMERCIAL

## 2025-01-23 ENCOUNTER — ROUTINE PRENATAL (OUTPATIENT)
Dept: OBGYN CLINIC | Age: 40
End: 2025-01-23

## 2025-01-23 VITALS — DIASTOLIC BLOOD PRESSURE: 60 MMHG | BODY MASS INDEX: 34.02 KG/M2 | WEIGHT: 186 LBS | SYSTOLIC BLOOD PRESSURE: 108 MMHG

## 2025-01-23 DIAGNOSIS — Z67.91 RH NEGATIVE STATUS DURING PREGNANCY IN THIRD TRIMESTER: ICD-10-CM

## 2025-01-23 DIAGNOSIS — O09.512 HIGH-RISK PREGNANCY, PRIMIGRAVIDA OF ADVANCED MATERNAL AGE IN SECOND TRIMESTER: ICD-10-CM

## 2025-01-23 DIAGNOSIS — O26.893 RH NEGATIVE STATUS DURING PREGNANCY IN THIRD TRIMESTER: ICD-10-CM

## 2025-01-23 DIAGNOSIS — O09.513 HIGH-RISK PREGNANCY, PRIMIGRAVIDA OF ADVANCED MATERNAL AGE IN THIRD TRIMESTER: Primary | ICD-10-CM

## 2025-01-23 PROCEDURE — 76816 OB US FOLLOW-UP PER FETUS: CPT | Performed by: OBSTETRICS & GYNECOLOGY

## 2025-01-23 PROCEDURE — 0501F PRENATAL FLOW SHEET: CPT | Performed by: OBSTETRICS & GYNECOLOGY

## 2025-01-23 NOTE — PROGRESS NOTES
Chief Complaint   Patient presents with    Pregnancy Ultrasound    Routine Prenatal Visit     +FM, no VB, LOF.     Vitals:    25 0944   BP: 108/60         2025     9:44 AM 2025    10:24 AM 2024    10:47 AM 2024    11:05 AM 10/29/2024    10:04 AM 10/8/2024     3:20 PM 2024     9:17 AM   Weight Metrics   Weight 186 lb 181 lb 174 lb 164 lb 156 lb 150 lb 150 lb   BMI (Calculated) 0 kg/m2 0 kg/m2 0 kg/m2 0 kg/m2 0 kg/m2 27.5 kg/m2 0 kg/m2     Ultrasound today:  See scanned report for full details.   All images viewed, read and interpreted by this MD.  EFW 2546g, 5 lbs 10oz. 58%ile  BPD 96%ile  HC 88%ile  AC 67%ile  FL 38%ile  APOORVA 20, SDVP 7   bpm  Anterior placenta  Breech  Impression/Interpretation: Appropriate interval growth for 34wk gestation, normal APOORVA, breech presentation  Plan: Repeat US in 2wks w/ CURTIS to determine whether ECV may need to be considered.  Valencia Doss MD FACOG    1. High-risk pregnancy, primigravida of advanced maternal age in third trimester    2. Rh negative status during pregnancy in third trimester      No orders of the defined types were placed in this encounter.    No orders of the defined types were placed in this encounter.    40yo  @ 34w1d for CURTIS:    Discussed imaging as interpreted above. Growth is approp. Normal fluid, and BREECH presentation discussed. Will need imaging next appt to reassess whether ECV should be considered.   Routine OB counseling discussed.   Kick counts & labor precautions reviewed.   RTC 2wks for BPP & CURTIS

## 2025-02-06 ENCOUNTER — ROUTINE PRENATAL (OUTPATIENT)
Dept: OBGYN CLINIC | Age: 40
End: 2025-02-06

## 2025-02-06 ENCOUNTER — PROCEDURE VISIT (OUTPATIENT)
Dept: OBGYN CLINIC | Age: 40
End: 2025-02-06
Payer: COMMERCIAL

## 2025-02-06 VITALS — SYSTOLIC BLOOD PRESSURE: 112 MMHG | BODY MASS INDEX: 34.75 KG/M2 | DIASTOLIC BLOOD PRESSURE: 68 MMHG | WEIGHT: 190 LBS

## 2025-02-06 DIAGNOSIS — F41.9 ANXIETY DURING PREGNANCY IN THIRD TRIMESTER, ANTEPARTUM: ICD-10-CM

## 2025-02-06 DIAGNOSIS — O26.892 RH NEGATIVE STATUS DURING PREGNANCY IN SECOND TRIMESTER: ICD-10-CM

## 2025-02-06 DIAGNOSIS — Z36.85 ANTENATAL SCREENING FOR STREPTOCOCCUS B: ICD-10-CM

## 2025-02-06 DIAGNOSIS — Z67.91 RH NEGATIVE STATUS DURING PREGNANCY IN SECOND TRIMESTER: ICD-10-CM

## 2025-02-06 DIAGNOSIS — Z67.91 RH NEGATIVE STATUS DURING PREGNANCY IN THIRD TRIMESTER: ICD-10-CM

## 2025-02-06 DIAGNOSIS — O99.343 ANXIETY DURING PREGNANCY IN THIRD TRIMESTER, ANTEPARTUM: ICD-10-CM

## 2025-02-06 DIAGNOSIS — O99.213 OBESITY AFFECTING PREGNANCY IN THIRD TRIMESTER, UNSPECIFIED OBESITY TYPE: ICD-10-CM

## 2025-02-06 DIAGNOSIS — O09.512 HIGH-RISK PREGNANCY, PRIMIGRAVIDA OF ADVANCED MATERNAL AGE IN SECOND TRIMESTER: Primary | ICD-10-CM

## 2025-02-06 DIAGNOSIS — O26.893 RH NEGATIVE STATUS DURING PREGNANCY IN THIRD TRIMESTER: ICD-10-CM

## 2025-02-06 DIAGNOSIS — F41.9 ANXIETY DURING PREGNANCY, ANTEPARTUM, SECOND TRIMESTER: ICD-10-CM

## 2025-02-06 DIAGNOSIS — O09.513 HIGH-RISK PREGNANCY, PRIMIGRAVIDA OF ADVANCED MATERNAL AGE IN THIRD TRIMESTER: Primary | ICD-10-CM

## 2025-02-06 DIAGNOSIS — O99.342 ANXIETY DURING PREGNANCY, ANTEPARTUM, SECOND TRIMESTER: ICD-10-CM

## 2025-02-06 PROCEDURE — 0502F SUBSEQUENT PRENATAL CARE: CPT | Performed by: OBSTETRICS & GYNECOLOGY

## 2025-02-06 PROCEDURE — 76819 FETAL BIOPHYS PROFIL W/O NST: CPT | Performed by: OBSTETRICS & GYNECOLOGY

## 2025-02-06 RX ORDER — FOLIC ACID 1 MG/1
1 TABLET ORAL DAILY
COMMUNITY

## 2025-02-06 NOTE — PROGRESS NOTES
Chief Complaint   Patient presents with    Pregnancy Ultrasound    Routine Prenatal Visit     Right hand swollen, feet and leg swollen, legs feel heavy, Nausea, low back pain     +FM. No VB, No LOF. Swelling as detailed above.     Vitals:    25 1447   BP: 112/68         2025     2:22 PM 2025     2:47 PM 2025     9:44 AM 2025    10:24 AM 2024    10:47 AM 2024    11:05 AM 10/29/2024    10:04 AM   Weight Metrics   Weight 190 lb 12.8 oz 190 lb 186 lb 181 lb 174 lb 164 lb 156 lb   BMI (Calculated) 0 kg/m2 0 kg/m2 0 kg/m2 0 kg/m2 0 kg/m2 0 kg/m2 0 kg/m2     Ultrasound today:  See scanned report for full details.   All images viewed, read and interpreted by this MD.  BPP 8/  APOORVA 22, SDVP 6.7   bpm   Anterior placenta  Transverse, head to maternal left  Impression/Interpretation: reactive fetal testing in 3rd trimester, normal amniotic fluid, transverse fetus.  Plan: RTC 1 wk for BPP & CURTIS, if remains transverse may consider ECV.  Valencia Doss MD FACOG    1. High-risk pregnancy, primigravida of advanced maternal age in second trimester    2. Anxiety during pregnancy, antepartum, second trimester    3. Rh negative status during pregnancy in second trimester    4.  screening for streptococcus B      Orders Placed This Encounter   Procedures    Culture, GBS with Penicillin Allergy     No orders of the defined types were placed in this encounter.    40yo  at 36w1d for CURTIS:    Discussed imaging as interpreted above. Transverse fetus - needs recheck next week in order to determine if needs ECV, discussed r/b/a.   Kick counts and labor precautions reviewed.   Routine OB counseling discussed.   GBS collected, no SVE performed. No  lesions.   RTC 1wk w/ BPP (pt of ALD)

## 2025-02-09 LAB
BACTERIA SPEC CULT: NORMAL
SERVICE CMNT-IMP: NORMAL

## 2025-02-12 ENCOUNTER — ROUTINE PRENATAL (OUTPATIENT)
Dept: OBGYN CLINIC | Age: 40
End: 2025-02-12

## 2025-02-12 ENCOUNTER — PROCEDURE VISIT (OUTPATIENT)
Dept: OBGYN CLINIC | Age: 40
End: 2025-02-12
Payer: COMMERCIAL

## 2025-02-12 VITALS — DIASTOLIC BLOOD PRESSURE: 76 MMHG | BODY MASS INDEX: 34.9 KG/M2 | SYSTOLIC BLOOD PRESSURE: 128 MMHG | WEIGHT: 190.8 LBS

## 2025-02-12 DIAGNOSIS — O26.892 RH NEGATIVE STATUS DURING PREGNANCY IN SECOND TRIMESTER: ICD-10-CM

## 2025-02-12 DIAGNOSIS — Z67.91 RH NEGATIVE STATUS DURING PREGNANCY IN SECOND TRIMESTER: ICD-10-CM

## 2025-02-12 DIAGNOSIS — O09.523 HIGH RISK PREGNANCY, MULTIGRAVIDA OF ADVANCED MATERNAL AGE IN THIRD TRIMESTER: Primary | ICD-10-CM

## 2025-02-12 DIAGNOSIS — F41.9 ANXIETY DURING PREGNANCY, ANTEPARTUM, SECOND TRIMESTER: ICD-10-CM

## 2025-02-12 DIAGNOSIS — O09.513 HIGH-RISK PREGNANCY, PRIMIGRAVIDA OF ADVANCED MATERNAL AGE IN THIRD TRIMESTER: ICD-10-CM

## 2025-02-12 DIAGNOSIS — O99.342 ANXIETY DURING PREGNANCY, ANTEPARTUM, SECOND TRIMESTER: ICD-10-CM

## 2025-02-12 DIAGNOSIS — O32.9XX0 MALPRESENTATION BEFORE ONSET OF LABOR, SINGLE OR UNSPECIFIED FETUS: ICD-10-CM

## 2025-02-12 DIAGNOSIS — Z36.85 ANTENATAL SCREENING FOR STREPTOCOCCUS B: ICD-10-CM

## 2025-02-12 DIAGNOSIS — O09.512 HIGH-RISK PREGNANCY, PRIMIGRAVIDA OF ADVANCED MATERNAL AGE IN SECOND TRIMESTER: Primary | ICD-10-CM

## 2025-02-12 PROCEDURE — 0501F PRENATAL FLOW SHEET: CPT | Performed by: OBSTETRICS & GYNECOLOGY

## 2025-02-12 PROCEDURE — 76819 FETAL BIOPHYS PROFIL W/O NST: CPT | Performed by: OBSTETRICS & GYNECOLOGY

## 2025-02-12 ASSESSMENT — PATIENT HEALTH QUESTIONNAIRE - PHQ9
SUM OF ALL RESPONSES TO PHQ QUESTIONS 1-9: 0
SUM OF ALL RESPONSES TO PHQ9 QUESTIONS 1 & 2: 0
2. FEELING DOWN, DEPRESSED OR HOPELESS: NOT AT ALL
1. LITTLE INTEREST OR PLEASURE IN DOING THINGS: NOT AT ALL

## 2025-02-12 NOTE — PROGRESS NOTES
OB return  Murtaza Robles is a 39 y.o. female   with 37w0d IUP with Estimated Date of Delivery: 3/5/25 presenting to the clinic as a routine OB.   Denies any complaints.    OB US report:  Images reviewed today. Report scanned into media  Indication: AMA  Findings: transverse, bpp , APOORVA 21.84  Assessment: reasurring testing    Problem list reviewed and updated    Pregnancy complicated by:  1. AMA- 39 yr old, sp MFM    2. Malpresentation- Transverse today, declines ECV    Physical Examination:  /76   Wt 86.5 kg (190 lb 12.8 oz)   LMP 2024   BMI 34.90 kg/m²    Gen: AAOx3  Ab: soft, NTTP, gravid uterus  Skin: no edema    Plan:  --RTC 1 weeks, repeat US for presentation, if still transverse patient agreed to scheduling Primary LTCD

## 2025-02-20 ENCOUNTER — TELEPHONE (OUTPATIENT)
Dept: OBGYN CLINIC | Age: 40
End: 2025-02-20

## 2025-02-20 ENCOUNTER — PROCEDURE VISIT (OUTPATIENT)
Dept: OBGYN CLINIC | Age: 40
End: 2025-02-20
Payer: COMMERCIAL

## 2025-02-20 ENCOUNTER — ROUTINE PRENATAL (OUTPATIENT)
Dept: OBGYN CLINIC | Age: 40
End: 2025-02-20

## 2025-02-20 VITALS — DIASTOLIC BLOOD PRESSURE: 82 MMHG | SYSTOLIC BLOOD PRESSURE: 114 MMHG | WEIGHT: 192.8 LBS | BODY MASS INDEX: 35.26 KG/M2

## 2025-02-20 DIAGNOSIS — O99.213 OBESITY AFFECTING PREGNANCY IN THIRD TRIMESTER, UNSPECIFIED OBESITY TYPE: ICD-10-CM

## 2025-02-20 DIAGNOSIS — O09.523 HIGH RISK PREGNANCY, MULTIGRAVIDA OF ADVANCED MATERNAL AGE IN THIRD TRIMESTER: Primary | ICD-10-CM

## 2025-02-20 DIAGNOSIS — O32.9XX0 MALPRESENTATION BEFORE ONSET OF LABOR, SINGLE OR UNSPECIFIED FETUS: ICD-10-CM

## 2025-02-20 DIAGNOSIS — O40.3XX0 POLYHYDRAMNIOS IN THIRD TRIMESTER COMPLICATION, SINGLE OR UNSPECIFIED FETUS: ICD-10-CM

## 2025-02-20 DIAGNOSIS — F41.9 ANXIETY DURING PREGNANCY, ANTEPARTUM, SECOND TRIMESTER: ICD-10-CM

## 2025-02-20 DIAGNOSIS — Z3A.38 38 WEEKS GESTATION OF PREGNANCY: ICD-10-CM

## 2025-02-20 DIAGNOSIS — O99.342 ANXIETY DURING PREGNANCY, ANTEPARTUM, SECOND TRIMESTER: ICD-10-CM

## 2025-02-20 DIAGNOSIS — O26.892 RH NEGATIVE STATUS DURING PREGNANCY IN SECOND TRIMESTER: ICD-10-CM

## 2025-02-20 DIAGNOSIS — Z67.91 RH NEGATIVE STATUS DURING PREGNANCY IN SECOND TRIMESTER: ICD-10-CM

## 2025-02-20 PROCEDURE — 76816 OB US FOLLOW-UP PER FETUS: CPT | Performed by: OBSTETRICS & GYNECOLOGY

## 2025-02-20 PROCEDURE — 76819 FETAL BIOPHYS PROFIL W/O NST: CPT | Performed by: OBSTETRICS & GYNECOLOGY

## 2025-02-20 NOTE — TELEPHONE ENCOUNTER
C section scheduled with  [facility].  C section 2/26 at 9 AM with Dr Cline d/t primary for breech, poly, and macrosomia.   Pt notified of instructions.

## 2025-02-20 NOTE — PROGRESS NOTES
Murtaza  presents for CURTIS. . 38w1d.    Taking Prenatal Vitamins: Yes  She is noticing:  no unusual complaints  Here with  today.   Gfm  Irreg ctx's  Cx cl/th/high    US shows:  Large baby with efw 4100gm, symmetrical  Bpd 98%. AC 92%  Breech  Mild poly- 23. DVP 9.5  Bpp   Images reviewed.  Official report sent for scanning to chart    Big baby and breech. G1.   Pt not interested in ECV and I don't recommend it.   Offered c/s with me .  Form given to staff to schedule.  As some impt info is always in the OB flowsheet, please also refer to that- including for billing/coding Qs.     No problem-specific Assessment & Plan notes found for this encounter.       Murtaza was seen today for pregnancy ultrasound and routine prenatal visit.    Diagnoses and all orders for this visit:    High risk pregnancy, multigravida of advanced maternal age in third trimester    Malpresentation before onset of labor, single or unspecified fetus    Polyhydramnios in third trimester complication, single or unspecified fetus    38 weeks gestation of pregnancy

## 2025-02-26 ENCOUNTER — ANESTHESIA EVENT (OUTPATIENT)
Dept: OPERATING ROOM | Age: 40
End: 2025-02-26
Payer: COMMERCIAL

## 2025-02-28 ENCOUNTER — ANESTHESIA (OUTPATIENT)
Dept: OPERATING ROOM | Age: 40
End: 2025-02-28
Payer: COMMERCIAL

## 2025-02-28 ENCOUNTER — HOSPITAL ENCOUNTER (INPATIENT)
Age: 40
LOS: 2 days | Discharge: HOME | End: 2025-03-02
Attending: OBSTETRICS & GYNECOLOGY | Admitting: OBSTETRICS & GYNECOLOGY
Payer: COMMERCIAL

## 2025-02-28 ENCOUNTER — APPOINTMENT (OUTPATIENT)
Dept: LABOR AND DELIVERY | Age: 40
End: 2025-02-28
Payer: COMMERCIAL

## 2025-02-28 DIAGNOSIS — Z98.891 H/O CESAREAN SECTION: Primary | ICD-10-CM

## 2025-02-28 PROBLEM — O32.9XX0 MALPRESENTATION OF FETUS, ANTEPARTUM: Status: ACTIVE | Noted: 2025-02-28

## 2025-02-28 LAB
ABO + RH BLD: NORMAL
BLOOD GROUP ANTIBODIES SERPL: NORMAL
CREAT SERPL-MCNC: 0.51 MG/DL (ref 0.6–1.1)
ERYTHROCYTE [DISTWIDTH] IN BLOOD BY AUTOMATED COUNT: 13.3 % (ref 11.9–14.6)
HCT VFR BLD AUTO: 39.7 % (ref 35.8–46.3)
HGB BLD-MCNC: 13.6 G/DL (ref 11.7–15.4)
MCH RBC QN AUTO: 31.1 PG (ref 26.1–32.9)
MCHC RBC AUTO-ENTMCNC: 34.3 G/DL (ref 31.4–35)
MCV RBC AUTO: 90.6 FL (ref 82–102)
NRBC # BLD: 0 K/UL (ref 0–0.2)
PLATELET # BLD AUTO: 256 K/UL (ref 150–450)
PMV BLD AUTO: 10.8 FL (ref 9.4–12.3)
RBC # BLD AUTO: 4.38 M/UL (ref 4.05–5.2)
SPECIMEN EXP DATE BLD: NORMAL
T PALLIDUM AB SER QL IA: NONREACTIVE
WBC # BLD AUTO: 14.9 K/UL (ref 4.3–11.1)

## 2025-02-28 PROCEDURE — 6360000002 HC RX W HCPCS: Performed by: OBSTETRICS & GYNECOLOGY

## 2025-02-28 PROCEDURE — 2580000003 HC RX 258: Performed by: OBSTETRICS & GYNECOLOGY

## 2025-02-28 PROCEDURE — 86900 BLOOD TYPING SEROLOGIC ABO: CPT

## 2025-02-28 PROCEDURE — 59510 CESAREAN DELIVERY: CPT | Performed by: OBSTETRICS & GYNECOLOGY

## 2025-02-28 PROCEDURE — 99465 NB RESUSCITATION: CPT

## 2025-02-28 PROCEDURE — 3609079900 HC CESAREAN SECTION: Performed by: OBSTETRICS & GYNECOLOGY

## 2025-02-28 PROCEDURE — 7100000001 HC PACU RECOVERY - ADDTL 15 MIN: Performed by: OBSTETRICS & GYNECOLOGY

## 2025-02-28 PROCEDURE — 7100000000 HC PACU RECOVERY - FIRST 15 MIN: Performed by: OBSTETRICS & GYNECOLOGY

## 2025-02-28 PROCEDURE — 6370000000 HC RX 637 (ALT 250 FOR IP): Performed by: ANESTHESIOLOGY

## 2025-02-28 PROCEDURE — 2500000003 HC RX 250 WO HCPCS

## 2025-02-28 PROCEDURE — 6370000000 HC RX 637 (ALT 250 FOR IP): Performed by: OBSTETRICS & GYNECOLOGY

## 2025-02-28 PROCEDURE — 82565 ASSAY OF CREATININE: CPT

## 2025-02-28 PROCEDURE — 86780 TREPONEMA PALLIDUM: CPT

## 2025-02-28 PROCEDURE — 86850 RBC ANTIBODY SCREEN: CPT

## 2025-02-28 PROCEDURE — 3700000001 HC ADD 15 MINUTES (ANESTHESIA): Performed by: OBSTETRICS & GYNECOLOGY

## 2025-02-28 PROCEDURE — 2500000003 HC RX 250 WO HCPCS: Performed by: ANESTHESIOLOGY

## 2025-02-28 PROCEDURE — 6360000002 HC RX W HCPCS: Performed by: ANESTHESIOLOGY

## 2025-02-28 PROCEDURE — 85027 COMPLETE CBC AUTOMATED: CPT

## 2025-02-28 PROCEDURE — 2709999900 HC NON-CHARGEABLE SUPPLY: Performed by: OBSTETRICS & GYNECOLOGY

## 2025-02-28 PROCEDURE — 6360000002 HC RX W HCPCS

## 2025-02-28 PROCEDURE — 2580000003 HC RX 258: Performed by: ANESTHESIOLOGY

## 2025-02-28 PROCEDURE — 3700000000 HC ANESTHESIA ATTENDED CARE: Performed by: OBSTETRICS & GYNECOLOGY

## 2025-02-28 PROCEDURE — 1100000000 HC RM PRIVATE

## 2025-02-28 PROCEDURE — 86901 BLOOD TYPING SEROLOGIC RH(D): CPT

## 2025-02-28 RX ORDER — SODIUM CHLORIDE 9 MG/ML
INJECTION, SOLUTION INTRAVENOUS PRN
Status: DISCONTINUED | OUTPATIENT
Start: 2025-02-28 | End: 2025-02-28

## 2025-02-28 RX ORDER — CITRIC ACID/SODIUM CITRATE 334-500MG
30 SOLUTION, ORAL ORAL ONCE
Status: COMPLETED | OUTPATIENT
Start: 2025-02-28 | End: 2025-02-28

## 2025-02-28 RX ORDER — SODIUM CHLORIDE, SODIUM LACTATE, POTASSIUM CHLORIDE, CALCIUM CHLORIDE 600; 310; 30; 20 MG/100ML; MG/100ML; MG/100ML; MG/100ML
INJECTION, SOLUTION INTRAVENOUS CONTINUOUS
Status: DISCONTINUED | OUTPATIENT
Start: 2025-02-28 | End: 2025-02-28

## 2025-02-28 RX ORDER — NALOXONE HYDROCHLORIDE 0.4 MG/ML
INJECTION, SOLUTION INTRAMUSCULAR; INTRAVENOUS; SUBCUTANEOUS PRN
Status: ACTIVE | OUTPATIENT
Start: 2025-02-28 | End: 2025-03-01

## 2025-02-28 RX ORDER — SODIUM CHLORIDE 0.9 % (FLUSH) 0.9 %
5-40 SYRINGE (ML) INJECTION PRN
Status: DISCONTINUED | OUTPATIENT
Start: 2025-02-28 | End: 2025-03-01

## 2025-02-28 RX ORDER — ACETAMINOPHEN 325 MG/1
975 TABLET ORAL ONCE
Status: COMPLETED | OUTPATIENT
Start: 2025-02-28 | End: 2025-02-28

## 2025-02-28 RX ORDER — LIDOCAINE HYDROCHLORIDE 10 MG/ML
1 INJECTION, SOLUTION INFILTRATION; PERINEURAL
Status: DISCONTINUED | OUTPATIENT
Start: 2025-02-28 | End: 2025-02-28

## 2025-02-28 RX ORDER — DOCUSATE SODIUM 100 MG/1
100 CAPSULE, LIQUID FILLED ORAL 2 TIMES DAILY
Status: DISCONTINUED | OUTPATIENT
Start: 2025-02-28 | End: 2025-03-02 | Stop reason: HOSPADM

## 2025-02-28 RX ORDER — SODIUM CHLORIDE 9 MG/ML
INJECTION, SOLUTION INTRAVENOUS PRN
Status: DISCONTINUED | OUTPATIENT
Start: 2025-02-28 | End: 2025-03-01

## 2025-02-28 RX ORDER — HYDROMORPHONE HYDROCHLORIDE 1 MG/ML
0.5 INJECTION, SOLUTION INTRAMUSCULAR; INTRAVENOUS; SUBCUTANEOUS EVERY 4 HOURS PRN
Status: DISCONTINUED | OUTPATIENT
Start: 2025-02-28 | End: 2025-03-01

## 2025-02-28 RX ORDER — SODIUM CHLORIDE 0.9 % (FLUSH) 0.9 %
5-40 SYRINGE (ML) INJECTION PRN
Status: DISCONTINUED | OUTPATIENT
Start: 2025-02-28 | End: 2025-02-28

## 2025-02-28 RX ORDER — LANOLIN
CREAM (ML) TOPICAL
Status: DISCONTINUED | OUTPATIENT
Start: 2025-02-28 | End: 2025-03-02 | Stop reason: HOSPADM

## 2025-02-28 RX ORDER — KETOROLAC TROMETHAMINE 30 MG/ML
30 INJECTION, SOLUTION INTRAMUSCULAR; INTRAVENOUS EVERY 6 HOURS
Status: DISCONTINUED | OUTPATIENT
Start: 2025-02-28 | End: 2025-03-01

## 2025-02-28 RX ORDER — SODIUM CHLORIDE 0.9 % (FLUSH) 0.9 %
5-40 SYRINGE (ML) INJECTION EVERY 12 HOURS SCHEDULED
Status: DISCONTINUED | OUTPATIENT
Start: 2025-02-28 | End: 2025-02-28

## 2025-02-28 RX ORDER — BUPIVACAINE HYDROCHLORIDE 7.5 MG/ML
INJECTION, SOLUTION INTRASPINAL
Status: COMPLETED | OUTPATIENT
Start: 2025-02-28 | End: 2025-02-28

## 2025-02-28 RX ORDER — SODIUM CHLORIDE, SODIUM LACTATE, POTASSIUM CHLORIDE, AND CALCIUM CHLORIDE .6; .31; .03; .02 G/100ML; G/100ML; G/100ML; G/100ML
1000 INJECTION, SOLUTION INTRAVENOUS ONCE
Status: COMPLETED | OUTPATIENT
Start: 2025-02-28 | End: 2025-02-28

## 2025-02-28 RX ORDER — MORPHINE SULFATE 0.5 MG/ML
INJECTION, SOLUTION EPIDURAL; INTRATHECAL; INTRAVENOUS
Status: COMPLETED | OUTPATIENT
Start: 2025-02-28 | End: 2025-02-28

## 2025-02-28 RX ORDER — SODIUM CHLORIDE, SODIUM LACTATE, POTASSIUM CHLORIDE, CALCIUM CHLORIDE 600; 310; 30; 20 MG/100ML; MG/100ML; MG/100ML; MG/100ML
INJECTION, SOLUTION INTRAVENOUS CONTINUOUS
Status: DISCONTINUED | OUTPATIENT
Start: 2025-02-28 | End: 2025-03-01

## 2025-02-28 RX ORDER — PANTOPRAZOLE SODIUM 40 MG/1
40 TABLET, DELAYED RELEASE ORAL DAILY PRN
Status: DISCONTINUED | OUTPATIENT
Start: 2025-02-28 | End: 2025-03-02 | Stop reason: HOSPADM

## 2025-02-28 RX ORDER — ONDANSETRON 2 MG/ML
4 INJECTION INTRAMUSCULAR; INTRAVENOUS EVERY 6 HOURS PRN
Status: DISCONTINUED | OUTPATIENT
Start: 2025-02-28 | End: 2025-02-28

## 2025-02-28 RX ORDER — ACETAMINOPHEN 325 MG/1
650 TABLET ORAL EVERY 6 HOURS
Status: DISCONTINUED | OUTPATIENT
Start: 2025-02-28 | End: 2025-03-01

## 2025-02-28 RX ORDER — ONDANSETRON 2 MG/ML
4 INJECTION INTRAMUSCULAR; INTRAVENOUS ONCE
Status: COMPLETED | OUTPATIENT
Start: 2025-02-28 | End: 2025-02-28

## 2025-02-28 RX ORDER — SODIUM CHLORIDE 0.9 % (FLUSH) 0.9 %
10 SYRINGE (ML) INJECTION PRN
Status: DISCONTINUED | OUTPATIENT
Start: 2025-02-28 | End: 2025-02-28

## 2025-02-28 RX ORDER — SIMETHICONE 80 MG
80 TABLET,CHEWABLE ORAL EVERY 6 HOURS PRN
Status: DISCONTINUED | OUTPATIENT
Start: 2025-02-28 | End: 2025-03-02 | Stop reason: HOSPADM

## 2025-02-28 RX ORDER — KETOROLAC TROMETHAMINE 30 MG/ML
INJECTION, SOLUTION INTRAMUSCULAR; INTRAVENOUS
Status: DISCONTINUED | OUTPATIENT
Start: 2025-02-28 | End: 2025-02-28 | Stop reason: SDUPTHER

## 2025-02-28 RX ORDER — NALBUPHINE HYDROCHLORIDE 10 MG/ML
5 INJECTION INTRAMUSCULAR; INTRAVENOUS; SUBCUTANEOUS EVERY 4 HOURS PRN
Status: ACTIVE | OUTPATIENT
Start: 2025-02-28 | End: 2025-03-01

## 2025-02-28 RX ORDER — CLINDAMYCIN PHOSPHATE 900 MG/50ML
900 INJECTION, SOLUTION INTRAVENOUS
Status: COMPLETED | OUTPATIENT
Start: 2025-02-28 | End: 2025-02-28

## 2025-02-28 RX ORDER — EPHEDRINE SULFATE 5 MG/ML
INJECTION INTRAVENOUS
Status: DISCONTINUED | OUTPATIENT
Start: 2025-02-28 | End: 2025-02-28 | Stop reason: SDUPTHER

## 2025-02-28 RX ORDER — OXYCODONE HYDROCHLORIDE 5 MG/1
5 TABLET ORAL EVERY 6 HOURS PRN
Status: ACTIVE | OUTPATIENT
Start: 2025-02-28 | End: 2025-03-01

## 2025-02-28 RX ORDER — SODIUM CHLORIDE 0.9 % (FLUSH) 0.9 %
5-40 SYRINGE (ML) INJECTION EVERY 12 HOURS SCHEDULED
Status: DISCONTINUED | OUTPATIENT
Start: 2025-02-28 | End: 2025-03-01

## 2025-02-28 RX ADMIN — SODIUM CHLORIDE, POTASSIUM CHLORIDE, SODIUM LACTATE AND CALCIUM CHLORIDE 1000 ML: 600; 310; 30; 20 INJECTION, SOLUTION INTRAVENOUS at 07:55

## 2025-02-28 RX ADMIN — EPHEDRINE SULFATE 10 MG: 5 INJECTION INTRAVENOUS at 09:44

## 2025-02-28 RX ADMIN — SODIUM CHLORIDE, SODIUM LACTATE, POTASSIUM CHLORIDE, AND CALCIUM CHLORIDE: 600; 310; 30; 20 INJECTION, SOLUTION INTRAVENOUS at 09:16

## 2025-02-28 RX ADMIN — PHENYLEPHRINE HYDROCHLORIDE 50 MCG: 0.1 INJECTION, SOLUTION INTRAVENOUS at 09:42

## 2025-02-28 RX ADMIN — FAMOTIDINE 20 MG: 10 INJECTION, SOLUTION INTRAVENOUS at 08:53

## 2025-02-28 RX ADMIN — CLINDAMYCIN PHOSPHATE 900 MG: 900 INJECTION, SOLUTION INTRAVENOUS at 08:53

## 2025-02-28 RX ADMIN — KETOROLAC TROMETHAMINE 30 MG: 30 INJECTION, SOLUTION INTRAMUSCULAR; INTRAVENOUS at 10:09

## 2025-02-28 RX ADMIN — BUPIVACAINE HYDROCHLORIDE IN DEXTROSE 10.5 MG: 7.5 INJECTION, SOLUTION SUBARACHNOID at 09:20

## 2025-02-28 RX ADMIN — PHENYLEPHRINE HYDROCHLORIDE 100 MCG: 0.1 INJECTION, SOLUTION INTRAVENOUS at 09:29

## 2025-02-28 RX ADMIN — SODIUM CITRATE AND CITRIC ACID MONOHYDRATE 30 ML: 2004; 3000 SOLUTION ORAL at 08:52

## 2025-02-28 RX ADMIN — PHENYLEPHRINE HYDROCHLORIDE 100 MCG: 0.1 INJECTION, SOLUTION INTRAVENOUS at 09:59

## 2025-02-28 RX ADMIN — ONDANSETRON 4 MG: 2 INJECTION, SOLUTION INTRAMUSCULAR; INTRAVENOUS at 08:53

## 2025-02-28 RX ADMIN — EPHEDRINE SULFATE 10 MG: 5 INJECTION INTRAVENOUS at 09:31

## 2025-02-28 RX ADMIN — GENTAMICIN SULFATE 440 MG: 40 INJECTION, SOLUTION INTRAMUSCULAR; INTRAVENOUS at 09:12

## 2025-02-28 RX ADMIN — PHENYLEPHRINE HYDROCHLORIDE 100 MCG: 0.1 INJECTION, SOLUTION INTRAVENOUS at 09:26

## 2025-02-28 RX ADMIN — SODIUM CHLORIDE, POTASSIUM CHLORIDE, SODIUM LACTATE AND CALCIUM CHLORIDE: 600; 310; 30; 20 INJECTION, SOLUTION INTRAVENOUS at 15:36

## 2025-02-28 RX ADMIN — Medication 1 AMPULE: at 21:44

## 2025-02-28 RX ADMIN — MORPHINE SULFATE 0.15 MG: 0.5 INJECTION, SOLUTION EPIDURAL; INTRATHECAL; INTRAVENOUS at 09:20

## 2025-02-28 RX ADMIN — ACETAMINOPHEN 975 MG: 325 TABLET, FILM COATED ORAL at 08:51

## 2025-02-28 RX ADMIN — SODIUM CHLORIDE, POTASSIUM CHLORIDE, SODIUM LACTATE AND CALCIUM CHLORIDE: 600; 310; 30; 20 INJECTION, SOLUTION INTRAVENOUS at 11:30

## 2025-02-28 RX ADMIN — Medication 500 ML/HR: at 09:42

## 2025-02-28 RX ADMIN — EPHEDRINE SULFATE 5 MG: 5 INJECTION INTRAVENOUS at 09:42

## 2025-02-28 NOTE — H&P
CS Admission Note    Murtaza Robles  [id number]  Estimated Date of Delivery: 3/5/25     OB History          2    Para        Term                AB   1    Living   0         SAB   1    IAB        Ectopic        Molar        Multiple        Live Births   0              Patient admitted with pregnancy at 39w1d for scheduled primary CS due to breech presentation. Accelerated fetal growth with suspected macrosomia as well. Pregnancy is otherwise uncomplicated.     Current Facility-Administered Medications   Medication Dose Route Frequency Provider Last Rate Last Admin    lidocaine 1 % injection 1 mL  1 mL IntraDERmal Once PRN Shantel Mccray MD        ondansetron (ZOFRAN) injection 4 mg  4 mg IntraVENous Once Shantel Mccray MD        famotidine (PEPCID) 20 MG/2ML 20 mg in sodium chloride (PF) 0.9 % 10 mL injection  20 mg IntraVENous Once Shantel Mccray MD        citric acid-sodium citrate (BICITRA) solution 30 mL  30 mL Oral Once Shantel Mccray MD        lactated ringers infusion   IntraVENous Continuous Shantel Mccray MD        sodium chloride flush 0.9 % injection 5-40 mL  5-40 mL IntraVENous 2 times per day Shantel Mccray MD        sodium chloride flush 0.9 % injection 5-40 mL  5-40 mL IntraVENous PRN Shantel Mccray MD        0.9 % sodium chloride infusion   IntraVENous PRN Shantel Mccray MD        lactated ringers bolus 1,000 mL  1,000 mL IntraVENous Once Breanna Mac, DO 1,000 mL/hr at 25 0755 1,000 mL at 25 0755    sodium chloride flush 0.9 % injection 5-40 mL  5-40 mL IntraVENous 2 times per day Breanna Mac L, DO        sodium chloride flush 0.9 % injection 10 mL  10 mL IntraVENous PRN Breanna aMc, DO        0.9 % sodium chloride infusion   IntraVENous PRN Breanna Mac,         famotidine (PEPCID) 20 MG/2ML 20 mg in sodium chloride (PF) 0.9 % 10 mL injection  20 mg IntraVENous Once Breanna Mac

## 2025-02-28 NOTE — ANESTHESIA POSTPROCEDURE EVALUATION
Department of Anesthesiology  Postprocedure Note    Patient: Murtaza Robles  MRN: [id number]  YOB: 1985  Date of evaluation: 2025    Procedure Summary       Date: 25 [place] [facility] [facility] [place]  [facility]    Anesthesia Start: 916 Anesthesia Stop:     Procedure:  SECTION (Abdomen) Diagnosis:       S/P primary low transverse       (S/P primary low transverse  [Z98.891])    Surgeons: Breanna Mac DO Responsible Provider: Shantel Mccray MD    Anesthesia Type: spinal ASA Status: 2            Anesthesia Type: No value filed.    Laura Phase I: Laura Score: 9    Laura Phase II: Laura Score: 10    Anesthesia Post Evaluation    Patient location during evaluation: floor  Patient participation: complete - patient participated  Level of consciousness: awake and alert  Airway patency: patent  Nausea & Vomiting: no nausea and no vomiting  Cardiovascular status: hemodynamically stable  Respiratory status: acceptable  Hydration status: stable  Pain management: satisfactory to patient    No notable events documented.

## 2025-02-28 NOTE — L&D DELIVERY NOTE
Luis M Robles [[id number]]      Labor Events      Cervical Ripening Date/Time:        Rupture Date/Time:  25 09:41:13   Rupture Type: AROM  Fluid Color: Meconium  Meconium Consistency: Thick  Fluid Odor: None  Fluid Volume: Moderate  Labor Complications: None       Anesthesia    Method: Spinal       Delivery Details      Delivery Date: 25 Delivery Time: 09:41:35   Delivery Type: , Low Transverse  Trial of Labor?: No   Categorization: Primary   Priority: scheduled  Indications for : Breech, Herpes Simplex Virus       Skin Incision Type: Pfannenstiel  Uterine Incision: Low Transverse       [place] Presentation    Presentation: Breech       Shoulder Dystocia    Shoulder Dystocia Present?: No       Assisted Delivery Details    Forceps Attempted?: No  Vacuum Extractor Attempted?: No                           Cord    Vessels: 3 Vessels  Complications: None  Delayed Cord Clamping?: Yes  Cord Blood Disposition: Lab  Gases Sent?: No              Placenta    Removal: Manual Removal  Appearance: Intact  Disposition: Discarded       Lacerations           Blood Loss  Mother: Murtaza Robles #[id number]     Start of Mother's Information      Delivery Blood Loss   Intrapartum & Postpartum: 25 0917 - 25 1023    Delivery Admission: 25 0657 - 25 1023         Intrapartum & Postpartum Delivery Admission    None                  End of Mother's Information  Mother: Murtaza Robles #[id number]                Delivery Providers    Delivering clinician: Breanna Mac DO     Provider Role    Breanna Mac,  Obstetrician    Kindra Harkins RN Primary Nurse    Luana Gross RN Primary  Nurse    Bakari Ortiz MD Neonatologist    Shantel Mccray MD Anesthesiologist    Lexie Mansfield, APRN - CRNA Nurse Anesthetist     Respiratory Therapist    Karan Jacobo Scrub Tech    Gala Hsu Scrub Tech

## 2025-02-28 NOTE — OP NOTE
massage and traction on the cord.  The uterus was cleaned with a lap pad. The uterine incision was closed with 1 Vicryl suture in a running interlocking fashion. A figure of eight was placed at the left hysterotomy corner.  Uterus was replaced into the abdomen.    The fascia was closed in a running non interlocking fashion using #1 V-lock suture.  The subcuticular layers were reapproximated with 2-0 plain gut in running fashion. The skin was closed with a 4-0 Monocryl in a subcuticular fashion.     The patient tolerated the procedure well. Sponge, lap, and needle counts were correct times three and the patient and baby were taken to recovery/postpartum room in stable condition.      Signed By: Breanna Mac DO     February 28, 2025

## 2025-02-28 NOTE — ANESTHESIA PROCEDURE NOTES
Spinal Block    Patient location during procedure: OR  End time: 2/28/2025 9:25 AM  Reason for block: primary anesthetic  Staffing  Performed: anesthesiologist   Anesthesiologist: Shantel Mccray MD  Performed by: Shantel Mccray MD  Authorized by: Shantel Mccray MD    Spinal Block  Patient position: sitting  Prep: ChloraPrep  Patient monitoring: cardiac monitor, continuous pulse ox, frequent blood pressure checks and oxygen  Approach: midline  Location: L3/L4  Guidance: paresthesia technique  Provider prep: mask and sterile gloves  Local infiltration: lidocaine  Needle  Needle type: pencil-tip   Needle gauge: 25 G  Needle length: 3.5 in  Assessment  Sensory level: T6  Swirl obtained: Yes  CSF: clear  Attempts: 1  Hemodynamics: stable  Preanesthetic Checklist  Completed: patient identified, IV checked, site marked, risks and benefits discussed, surgical/procedural consents, equipment checked, pre-op evaluation, timeout performed, anesthesia consent given, oxygen available, monitors applied/VS acknowledged, fire risk safety assessment completed and verbalized and blood product R/B/A discussed and consented

## 2025-02-28 NOTE — ANESTHESIA PRE PROCEDURE
Department of Anesthesiology  Preprocedure Note       Name:  Murtaza Robles   Age:  39 y.o.  :  1985                                          MRN:  [id number]         Date:  2025      Surgeon: Surgeon(s):  Breanna Mac DO    Procedure: Procedure(s):   SECTION    Medications prior to admission:   Prior to Admission medications    Medication Sig Start Date End Date Taking? Authorizing Provider   folic acid (FOLVITE) 1 MG tablet Take 1 tablet by mouth daily    Everardo Valdez MD   Prenatal Vit-Fe Fumarate-FA (PRENATAL VITAMIN) 27-0.8 MG TABS Take 1 tablet by mouth daily 24   Breanna Mac DO   Cholecalciferol (VITAMIN D-3 PO) Take by mouth    Everardo Valdez MD   aspirin 81 MG EC tablet Take 2 tablets by mouth daily 24   Breanna Mac DO   Ascorbic Acid (VITAMIN C) 250 MG tablet Take 1 tablet by mouth daily    Everardo Valdez MD   pyridoxine (B-6) 25 MG tablet Take 1 tablet by mouth daily    Everardo Valdez MD       Current medications:    No current facility-administered medications for this encounter.     Current Outpatient Medications   Medication Sig Dispense Refill   • folic acid (FOLVITE) 1 MG tablet Take 1 tablet by mouth daily     • Prenatal Vit-Fe Fumarate-FA (PRENATAL VITAMIN) 27-0.8 MG TABS Take 1 tablet by mouth daily 90 tablet 1   • Cholecalciferol (VITAMIN D-3 PO) Take by mouth     • aspirin 81 MG EC tablet Take 2 tablets by mouth daily 60 tablet 12   • Ascorbic Acid (VITAMIN C) 250 MG tablet Take 1 tablet by mouth daily     • pyridoxine (B-6) 25 MG tablet Take 1 tablet by mouth daily         Allergies:    Allergies   Allergen Reactions   • Hydrocodone Shortness Of Breath   • Pcn [Penicillins] Angioedema       Problem List:    Patient Active Problem List   Diagnosis Code   • High risk pregnancy, multigravida of advanced maternal age in third trimester O09.523   • Anxiety during pregnancy, antepartum, second trimester O99.342,

## 2025-03-01 ENCOUNTER — ANESTHESIA (OUTPATIENT)
Dept: MOTHER INFANT UNIT | Age: 40
End: 2025-03-01
Payer: COMMERCIAL

## 2025-03-01 ENCOUNTER — ANESTHESIA EVENT (OUTPATIENT)
Dept: MOTHER INFANT UNIT | Age: 40
End: 2025-03-01
Payer: COMMERCIAL

## 2025-03-01 LAB
BLOOD BANK CMNT PATIENT-IMP: NORMAL
FETAL SCREEN: NORMAL
HGB BLD-MCNC: 9.8 G/DL (ref 11.7–15.4)

## 2025-03-01 PROCEDURE — 6370000000 HC RX 637 (ALT 250 FOR IP): Performed by: OBSTETRICS & GYNECOLOGY

## 2025-03-01 PROCEDURE — 6360000002 HC RX W HCPCS: Performed by: ANESTHESIOLOGY

## 2025-03-01 PROCEDURE — 1100000000 HC RM PRIVATE

## 2025-03-01 PROCEDURE — 6370000000 HC RX 637 (ALT 250 FOR IP): Performed by: ANESTHESIOLOGY

## 2025-03-01 PROCEDURE — 85018 HEMOGLOBIN: CPT

## 2025-03-01 PROCEDURE — 2500000003 HC RX 250 WO HCPCS: Performed by: OBSTETRICS & GYNECOLOGY

## 2025-03-01 PROCEDURE — 85461 HEMOGLOBIN FETAL: CPT

## 2025-03-01 PROCEDURE — 36415 COLL VENOUS BLD VENIPUNCTURE: CPT

## 2025-03-01 PROCEDURE — 6360000002 HC RX W HCPCS: Performed by: OBSTETRICS & GYNECOLOGY

## 2025-03-01 RX ORDER — IBUPROFEN 800 MG/1
800 TABLET, FILM COATED ORAL EVERY 8 HOURS
Status: DISCONTINUED | OUTPATIENT
Start: 2025-03-01 | End: 2025-03-02 | Stop reason: HOSPADM

## 2025-03-01 RX ORDER — KETOROLAC TROMETHAMINE 30 MG/ML
30 INJECTION, SOLUTION INTRAMUSCULAR; INTRAVENOUS EVERY 6 HOURS
Status: DISCONTINUED | OUTPATIENT
Start: 2025-03-01 | End: 2025-03-01

## 2025-03-01 RX ORDER — OXYCODONE HYDROCHLORIDE 5 MG/1
10 TABLET ORAL EVERY 4 HOURS PRN
Status: DISCONTINUED | OUTPATIENT
Start: 2025-03-01 | End: 2025-03-02 | Stop reason: HOSPADM

## 2025-03-01 RX ORDER — ACETAMINOPHEN 325 MG/1
650 TABLET ORAL EVERY 4 HOURS
Status: DISCONTINUED | OUTPATIENT
Start: 2025-03-01 | End: 2025-03-02 | Stop reason: HOSPADM

## 2025-03-01 RX ORDER — FERROUS SULFATE 325(65) MG
325 TABLET ORAL 2 TIMES DAILY WITH MEALS
Status: DISCONTINUED | OUTPATIENT
Start: 2025-03-01 | End: 2025-03-02 | Stop reason: HOSPADM

## 2025-03-01 RX ORDER — DIPHENHYDRAMINE HCL 25 MG
25 CAPSULE ORAL EVERY 6 HOURS PRN
Status: DISCONTINUED | OUTPATIENT
Start: 2025-03-01 | End: 2025-03-02 | Stop reason: HOSPADM

## 2025-03-01 RX ORDER — ONDANSETRON 2 MG/ML
4 INJECTION INTRAMUSCULAR; INTRAVENOUS EVERY 6 HOURS PRN
Status: DISCONTINUED | OUTPATIENT
Start: 2025-03-01 | End: 2025-03-01

## 2025-03-01 RX ORDER — ONDANSETRON 4 MG/1
4 TABLET, ORALLY DISINTEGRATING ORAL EVERY 8 HOURS PRN
Status: DISCONTINUED | OUTPATIENT
Start: 2025-03-01 | End: 2025-03-02 | Stop reason: HOSPADM

## 2025-03-01 RX ORDER — IBUPROFEN 800 MG/1
800 TABLET, FILM COATED ORAL EVERY 8 HOURS
Status: DISCONTINUED | OUTPATIENT
Start: 2025-03-02 | End: 2025-03-01

## 2025-03-01 RX ORDER — OXYCODONE HYDROCHLORIDE 5 MG/1
5 TABLET ORAL EVERY 4 HOURS PRN
Status: DISCONTINUED | OUTPATIENT
Start: 2025-03-01 | End: 2025-03-02 | Stop reason: HOSPADM

## 2025-03-01 RX ADMIN — ACETAMINOPHEN 650 MG: 325 TABLET, FILM COATED ORAL at 16:23

## 2025-03-01 RX ADMIN — FERROUS SULFATE TAB 325 MG (65 MG ELEMENTAL FE) 325 MG: 325 (65 FE) TAB at 18:37

## 2025-03-01 RX ADMIN — FERROUS SULFATE TAB 325 MG (65 MG ELEMENTAL FE) 325 MG: 325 (65 FE) TAB at 11:44

## 2025-03-01 RX ADMIN — ACETAMINOPHEN 650 MG: 325 TABLET, FILM COATED ORAL at 04:54

## 2025-03-01 RX ADMIN — OXYCODONE 5 MG: 5 TABLET ORAL at 20:30

## 2025-03-01 RX ADMIN — Medication 1 AMPULE: at 09:39

## 2025-03-01 RX ADMIN — DOCUSATE SODIUM 100 MG: 100 CAPSULE, LIQUID FILLED ORAL at 09:39

## 2025-03-01 RX ADMIN — OXYCODONE 5 MG: 5 TABLET ORAL at 16:28

## 2025-03-01 RX ADMIN — SODIUM CHLORIDE, PRESERVATIVE FREE 10 ML: 5 INJECTION INTRAVENOUS at 09:39

## 2025-03-01 RX ADMIN — DOCUSATE SODIUM 100 MG: 100 CAPSULE, LIQUID FILLED ORAL at 20:30

## 2025-03-01 RX ADMIN — KETOROLAC TROMETHAMINE 30 MG: 30 INJECTION, SOLUTION INTRAMUSCULAR; INTRAVENOUS at 09:40

## 2025-03-01 RX ADMIN — RHO(D) IMMUNE GLOBULIN (HUMAN) 300 MCG: 1500 SOLUTION INTRAMUSCULAR at 18:37

## 2025-03-01 RX ADMIN — ACETAMINOPHEN 650 MG: 325 TABLET, FILM COATED ORAL at 11:44

## 2025-03-01 RX ADMIN — IBUPROFEN 800 MG: 800 TABLET, FILM COATED ORAL at 18:36

## 2025-03-01 RX ADMIN — KETOROLAC TROMETHAMINE 30 MG: 30 INJECTION, SOLUTION INTRAMUSCULAR; INTRAVENOUS at 01:41

## 2025-03-01 ASSESSMENT — PAIN DESCRIPTION - LOCATION
LOCATION: ABDOMEN;INCISION
LOCATION: ABDOMEN
LOCATION: ABDOMEN

## 2025-03-01 ASSESSMENT — PAIN DESCRIPTION - ORIENTATION: ORIENTATION: LOWER

## 2025-03-01 ASSESSMENT — PAIN SCALES - GENERAL
PAINLEVEL_OUTOF10: 4
PAINLEVEL_OUTOF10: 2
PAINLEVEL_OUTOF10: 3

## 2025-03-01 NOTE — PROGRESS NOTES
Daily Progress note:    Pt is POD#1 S/P primary  due to breech.  No complaints today.  Normal PO pain.  No GI/ issues.  Lochia < menses.  No F/C.    VITALS  Patient Vitals for the past 24 hrs:   Temp Pulse Resp BP SpO2   25 0759 97.9 °F (36.6 °C) 100 18 112/64 100 %   25 0310 97.9 °F (36.6 °C) 96 18 105/66 96 %   25 2326 98.1 °F (36.7 °C) (!) 109 18 120/64 96 %   25 97.9 °F (36.6 °C) (!) 106 18 111/66 97 %   25 1530 98.2 °F (36.8 °C) (!) 107 21 116/72 96 %   25 1305 97.8 °F (36.6 °C) 87 20 105/64 97 %   25 1219 -- 91 18 108/64 99 %   25 1200 -- 93 -- (!) 109/59 98 %   25 1145 -- 92 -- 125/63 98 %   25 1132 -- 99 -- 109/61 97 %   25 1116 -- 91 -- 114/65 96 %   25 1101 -- 100 -- 112/64 96 %   25 1055 -- 94 18 (!) 121/59 97 %   25 1029 98 °F (36.7 °C) (!) 110 -- (!) 127/59 99 %        CV - RRR  LUNGS - CTA bilaterally  ABD - soft, NABS, appropriate tenderness, incision C/D/I  EXT - tr edema bilaterally    Lab Results   Component Value Date    HGB 9.8 (L) 2025        POD #1 LTCS     Pt is breast feeding.  No issues or complaints today.   Stable. (+) ambulating, voiding. Routine PP/PO     DVT ppx SCDs    Anemia noted c/w EBL and hydration during stay. PO iron ordered. VSS. Excellent UOP    O NEGATIVE       Breannacj Mac DO  9:58 AM  25

## 2025-03-01 NOTE — PROGRESS NOTES
Contreras removed.  Patient tolerated well.  Sonya care done.  Patient ambulated in room and tolerated well.  Returned to bed, call light in reach.  Acknowledged to call for assistance when needed and first time to bathroom to void.

## 2025-03-01 NOTE — PROGRESS NOTES
Anesthesiology  Post-op Note    Post-op day 1 s/p  via spinal with neuraxial opioids for post-op pain management.  /64   Pulse (!) 109   Temp 98.1 °F (36.7 °C) (Axillary)   Resp 18   LMP 2024   SpO2 96%   Breastfeeding Unknown   Airway patent, patient appropriately hydrated and appears euvolemic.  Patient is Alert and oriented.  Pain is well controlled.  Pruritus is well controlled.  Nausea is absent.  No complaints about back or site of injection.  Motor and sensory function has returned to baseline in lower extremities. Patient is satisfied with anesthetic and reports no complications.  Continue current orders, then initiate surgeon's orders for pain management 24 hours after .  Follow up per surgeon.

## 2025-03-02 VITALS
HEART RATE: 99 BPM | SYSTOLIC BLOOD PRESSURE: 109 MMHG | TEMPERATURE: 97.7 F | RESPIRATION RATE: 18 BRPM | DIASTOLIC BLOOD PRESSURE: 79 MMHG | OXYGEN SATURATION: 98 %

## 2025-03-02 PROCEDURE — 6370000000 HC RX 637 (ALT 250 FOR IP): Performed by: OBSTETRICS & GYNECOLOGY

## 2025-03-02 RX ORDER — OXYCODONE HYDROCHLORIDE 5 MG/1
5 TABLET ORAL EVERY 6 HOURS PRN
Qty: 20 TABLET | Refills: 0 | Status: SHIPPED | OUTPATIENT
Start: 2025-03-02 | End: 2025-03-07

## 2025-03-02 RX ORDER — IBUPROFEN 800 MG/1
800 TABLET, FILM COATED ORAL EVERY 8 HOURS
Qty: 120 TABLET | Refills: 3 | Status: SHIPPED | OUTPATIENT
Start: 2025-03-02

## 2025-03-02 RX ORDER — PSEUDOEPHEDRINE HCL 30 MG
100 TABLET ORAL 2 TIMES DAILY
Qty: 60 CAPSULE | Refills: 0 | Status: SHIPPED | OUTPATIENT
Start: 2025-03-02

## 2025-03-02 RX ADMIN — Medication 1 AMPULE: at 08:54

## 2025-03-02 RX ADMIN — ACETAMINOPHEN 650 MG: 325 TABLET, FILM COATED ORAL at 06:12

## 2025-03-02 RX ADMIN — ACETAMINOPHEN 650 MG: 325 TABLET, FILM COATED ORAL at 00:06

## 2025-03-02 RX ADMIN — IBUPROFEN 800 MG: 800 TABLET, FILM COATED ORAL at 10:32

## 2025-03-02 RX ADMIN — OXYCODONE 5 MG: 5 TABLET ORAL at 06:12

## 2025-03-02 RX ADMIN — OXYCODONE 5 MG: 5 TABLET ORAL at 00:06

## 2025-03-02 RX ADMIN — IBUPROFEN 800 MG: 800 TABLET, FILM COATED ORAL at 02:06

## 2025-03-02 RX ADMIN — Medication 1 AMPULE: at 00:10

## 2025-03-02 RX ADMIN — DOCUSATE SODIUM 100 MG: 100 CAPSULE, LIQUID FILLED ORAL at 08:55

## 2025-03-02 RX ADMIN — ACETAMINOPHEN 650 MG: 325 TABLET, FILM COATED ORAL at 10:31

## 2025-03-02 RX ADMIN — FERROUS SULFATE TAB 325 MG (65 MG ELEMENTAL FE) 325 MG: 325 (65 FE) TAB at 08:55

## 2025-03-02 RX ADMIN — OXYCODONE 5 MG: 5 TABLET ORAL at 10:31

## 2025-03-02 ASSESSMENT — PAIN SCALES - GENERAL
PAINLEVEL_OUTOF10: 4
PAINLEVEL_OUTOF10: 5
PAINLEVEL_OUTOF10: 5

## 2025-03-02 ASSESSMENT — PAIN - FUNCTIONAL ASSESSMENT: PAIN_FUNCTIONAL_ASSESSMENT: ACTIVITIES ARE NOT PREVENTED

## 2025-03-02 ASSESSMENT — PAIN DESCRIPTION - DESCRIPTORS: DESCRIPTORS: SORE

## 2025-03-02 ASSESSMENT — PAIN DESCRIPTION - LOCATION
LOCATION: ABDOMEN

## 2025-03-02 NOTE — LACTATION NOTE
This note was copied from a baby's chart.  Baby just finished at breast and is asleep skin to skin.  Reviewed first 24 hour expectations.  Discussed feeding expectations in second day.  Encouraged to try at breast, offer both sides and alternate starting side.  Encouraged skin to skin.   Plan to visualize feeding prior to discharge and measure for flange fit.  Mom seems very concerned that baby is \"hungry\".  Reviewed medical indications to supplement and normal  behavior.  Mom may opt to supplement at any point if she wants to.    
This note was copied from a baby's chart.  In to see mom and infant for feeding observation. Lactation got baby skin to skin w/ mom and attempted w/ mom to have baby latch to left side in football hold. Baby uninterested and fussy. Tried other side, baby fed off and on x 5 minutes but not interested in feeding consistently past that. Mom set up to pump after that for 15 minutes, while dad fed baby formula in bottle. Reviewed importance of pumping after feeds where baby not consistently feeding well at breast, as well alternate scenario where insurance pumping behind daytime feeds where baby did feed well at breast, but getting larger doses of formula after to satisfy baby, can help increase mom's milk supply overall to what baby is getting used to. Measured moms nipples and discussed flange fit and recommendations. Mom to order flange inserts for personal pump. No further needs at this time.   
This note was copied from a baby's chart.  In to see mom and infant for follow up. Mom just finished breast feeding infant for 30 minutes and then fed baby bottle of formula. Encouraged mom to insurance pump after feeds baby gets formula if wanting milk supply to catch up to amount baby getting used to  with additional formula after. Will meet mom back for next feed to assist her w/ latching, etc.   
This note was copied from a baby's chart.  Lactation visit. Not latching. Attempting some, pumping. Drops of colostrum, but improving volume per mom. Baby bottle feeding well, taking 15ml per feed now. Encouraged mom to continue to pump diligently every 3 hours to stimulate breasts and milk supply. Has wireless pump for home use. Feed pumped milk plus formula supplement as needed for volume. Printed feeding plan given as guide for intake volume need based on age and weight. All questions answered.   
This note was copied from a baby's chart.  Wireless pumps are usually best for once your milk is already in.  Fast cycling stimulates let-down, slow cycling expresses available milk.  Always adjust vacuum (pull strength)  level to comfort.  Push the vacuum (+/-) level up until pull is a little uncomfortable and then back down until comfortable.  Pain does not equal milk.  Start pumping in the fast cycle for about 2 minutes to stimulate let-down.  Switch to longer pull expression mode after 2 minutes.  Majority of time should be in slower Expression Mode.  There are multiple settings that can be utilized with wireless pump.  These are the standard instructions.  [facility] Lactation [phone]      For additional, brand specific, wireless pump information please check out instructional videos from [employer] with [employer].  Search your pump brand to see instructions for use, tips and cleaning.        Flange fit can be an important part of comfortable and efficient pumping.   Standard 24mm flanges may be ok, but new information suggests using a breastshield (flange) closest to your nipple diameter measurement maybe best.  Nipple diameter can fluctuate throughout breastfeeding duration.  Suggest ordering a flange insert variety pack (any brand).  The variety pack contains flange inserts in  13mm, 15mm, 17mm, 19mm and 21mm sizes.  Flange inserts will fit in ANY 24 mm hard flange no matter what brand the pump is.  This includes plug-in and wireless pumps.      16 mm L     16 mm R.       Based on your above measurements (exact measurement/+1-2 mm), suggest trying 17/18 mm flanges to start.  Some people prefer a hard sided flange.  Once you feel you have found your ideal fit, you can order a hard sided flange compatible with your pump.  You will likely have to order off brand and depending on your pump, a hard flange in a smaller size may not be available.  Olive oil or coconut oil can be used before 
you are not putting baby to the breast you need to pump 8 times a day. Pump every 3 hours.    __x__5. Give baby all of the breast milk you obtain.  You can pump now and save the milk for the start of the next feeding.  Use formula to make up the difference in needed volume.       Suggest while mom is pumping an alternate caregiver feed baby.     AVERAGE INTAKES OF COLOSTRUM BY HEALTHY  INFANTS:  Time  Day Intake (ml per feeding)  Based on 8 feedings per day.  1st 24 hrs  1 2-10 ml  24-48 hrs  2 5-15 ml  48-72 hrs  3 15-30 ml (0.5-1 oz)  72-96 hrs  4 30-45 ml (1-1.5oz)  amount per feeding                          5-6      45-60 ml (1.5-2oz)                           7         75ml+ (2.5oz+)      By day 7, baby will need 75 ml or 2.5 oz at each feeding based on 8 feedings per day & baby’s weight. (1oz = 30ml).  Total milk volume needed in 24 hours by Day 7 is 20-22 oz per day based on baby's birthweight of 8-1.   The more often baby eats, the less volume they need per feeding.  If baby is eating more often than the minimum of 8 times per day, they may take less per feeding.    Comments:   When pumping, suggest using olive oil or coconut oil on your nipples before pumping to help reduce the friction.    Use feeding plan until follow up with pediatrician. Continue to attempt at the breast for most feeds. Pump every 3 hours if no latch. Give all pumped colostrum/breastmilk at each feeding.     OUTPATIENT APPOINTMENT Suggested.  Outpatient services are located on the 4th floor at [facility]. Check in at the 4th floor registration desk (the same one you used when you came to have your baby).  Call for questions [phone]

## 2025-03-02 NOTE — DISCHARGE INSTRUCTIONS
warmth, or redness at the injection site.  Tingling, weakness, or numbness in your legs or groin.   Watch closely for changes in your health, and be sure to contact your doctor or midwife if:    Your vaginal bleeding isn't decreasing.     You feel sad, anxious, or hopeless for more than a few days.     You are having problems with your breasts or breastfeeding.   Where can you learn more?  Go to https://www.[employer].net/patientEd and enter M806 to learn more about \" Section: What to Expect at Home.\"  Current as of: 2024  Content Version: 14.3  ©  [employer].   Care instructions adapted under license by [employer]. If you have questions about a medical condition or this instruction, always ask your healthcare professional. [employer] disclaims any warranty or liability for your use of this information.

## 2025-03-02 NOTE — DISCHARGE SUMMARY
Discharge Summary:     Date of Admission:  2025  6:57 AM  Date of Discharge:  25     Patient is a 39 y.o.  at 39w2d wks who was admitted for scheduled primary CS. The indication for  was breech.  A Low Transverse  was performed.   On post-op day #1, the patient had her catheter removed and was ambulating well in the muir.  Her post operative hemoglobin was stable. Patient had a normal post operative course.  Incision stayed clean and dry, without erythema.  Patient remained afebrile throughout the entire hospital stay.  On day of discharge, she was discharged home in good condition with routine post  instructions.   Pt was scheduled to follow up in two weeks for an incision check at [facility].     HTN diagnosis: no     Vitals:    25 0822   BP: 109/79   Pulse: 99   Resp: 18   Temp: 97.7 °F (36.5 °C)   SpO2: 98%   General: NAD  Cardio/pulm: RR and normal effort   Fundus below U  Incision C/D/I  Trace LE edema     Discharge Meds:       Medication List        START taking these medications      docusate 100 MG Caps  Commonly known as: COLACE, DULCOLAX  Take 100 mg by mouth 2 times daily     ibuprofen 800 MG tablet  Commonly known as: ADVIL;MOTRIN  Take 1 tablet by mouth in the morning and 1 tablet at noon and 1 tablet in the evening.     oxyCODONE 5 MG immediate release tablet  Commonly known as: ROXICODONE  Take 1 tablet by mouth every 6 hours as needed for Pain for up to 5 days. Max Daily Amount: 20 mg     witch hazel-glycerin pad  Commonly known as: TUCKS  Place rectally as needed.            CONTINUE taking these medications      folic acid 1 MG tablet  Commonly known as: FOLVITE     prenatal vitamin 27-0.8 MG Tabs  Take 1 tablet by mouth daily     pyridoxine 25 MG tablet  Commonly known as: B-6     vitamin C 250 MG tablet     VITAMIN D-3 PO            STOP taking these medications      aspirin 81 MG EC tablet               Where to Get Your

## 2025-03-02 NOTE — PROGRESS NOTES
Discharge instructions completed.  Patient voiced understanding.  Prescription sent electronically.  Patient discharged home.  Ambulating.  Wheelchair refused

## 2025-03-27 ENCOUNTER — POSTPARTUM VISIT (OUTPATIENT)
Dept: OBGYN CLINIC | Age: 40
End: 2025-03-27

## 2025-03-27 VITALS
BODY MASS INDEX: 30.18 KG/M2 | DIASTOLIC BLOOD PRESSURE: 62 MMHG | SYSTOLIC BLOOD PRESSURE: 104 MMHG | HEIGHT: 62 IN | WEIGHT: 164 LBS

## 2025-03-27 DIAGNOSIS — Z09 POSTOP CHECK: ICD-10-CM

## 2025-03-27 PROCEDURE — 0503F POSTPARTUM CARE VISIT: CPT | Performed by: NURSE PRACTITIONER

## 2025-03-27 NOTE — PROGRESS NOTES
CC:  C/S postop follow-up    HPI:  Murtaza presents for postop visit from  about 3 weeks ago. She delivered on 2025 via primary LTCS at 39w2d due to breech presentation.     The patient states was unable to come to appointment at 2 weeks due to  having kidney stone and had surgery shortly after delivery.     Patient doing well postpartum without significant complaints. Voiding without difficulty. Pain controlled on no medications at this point other than PRN Ibuprofen. Lochia lighter than delivery. +BM/Flatus.  Denies n/v, tolerating regular diet.     She states she is formula feeding which is going well. Denies any breast complaints (erythema, lesions, rashes, abscess, etc)     Denies any si/sx pp depression.         Patient Active Problem List    Diagnosis Date Noted    Rh negative status during pregnancy in second trimester 10/23/2024    Malpresentation of fetus, antepartum 2025    Breech presentation, no version 2025    Malpresentation before onset of labor 2025    High risk pregnancy, multigravida of advanced maternal age in third trimester 2024    Anxiety during pregnancy, antepartum, second trimester 2024          Physical Exam:   /62   Ht 1.575 m (5' 2\")   Wt 74.4 kg (164 lb)   LMP 2024   Breastfeeding No   BMI 30.00 kg/m²   Constitutional: She appears well-developed and well-nourished. No distress.   HENT:    Head: Normocephalic and atraumatic.   Lungs: CTAB, effort normal  Cardiovascular: RRR, no M/R/G  Abd:  Fundus firm and well below umbilicus, S/NTTP/ND, BS normoactive.  Incision c/d/i w/out erythema/induration   Skin: She is not diaphoretic.   Psychiatric: She has a normal mood and affect. Her behavior is normal. Thought content normal.         A/P:  Stable Post op condition. Gradually increase activity; continue pelvic rest and keep lifting to <15lbs until 6wks pp.    -Follow up 2 weeks for routine 6wk pp appt.

## 2025-04-08 ENCOUNTER — POSTPARTUM VISIT (OUTPATIENT)
Dept: OBGYN CLINIC | Age: 40
End: 2025-04-08

## 2025-04-08 VITALS
DIASTOLIC BLOOD PRESSURE: 70 MMHG | BODY MASS INDEX: 29.26 KG/M2 | WEIGHT: 159 LBS | HEIGHT: 62 IN | SYSTOLIC BLOOD PRESSURE: 112 MMHG

## 2025-04-08 PROBLEM — O99.342 ANXIETY DURING PREGNANCY, ANTEPARTUM, SECOND TRIMESTER: Status: RESOLVED | Noted: 2024-09-03 | Resolved: 2025-04-08

## 2025-04-08 PROBLEM — O09.523 HIGH RISK PREGNANCY, MULTIGRAVIDA OF ADVANCED MATERNAL AGE IN THIRD TRIMESTER: Status: RESOLVED | Noted: 2024-09-03 | Resolved: 2025-04-08

## 2025-04-08 PROBLEM — O26.892 RH NEGATIVE STATUS DURING PREGNANCY IN SECOND TRIMESTER: Status: RESOLVED | Noted: 2024-10-23 | Resolved: 2025-04-08

## 2025-04-08 PROBLEM — F41.9 ANXIETY DURING PREGNANCY, ANTEPARTUM, SECOND TRIMESTER: Status: RESOLVED | Noted: 2024-09-03 | Resolved: 2025-04-08

## 2025-04-08 PROBLEM — Z67.91 RH NEGATIVE STATUS DURING PREGNANCY IN SECOND TRIMESTER: Status: RESOLVED | Noted: 2024-10-23 | Resolved: 2025-04-08

## 2025-04-08 PROCEDURE — 0503F POSTPARTUM CARE VISIT: CPT | Performed by: OBSTETRICS & GYNECOLOGY

## 2025-04-08 NOTE — PROGRESS NOTES
Neurologic:  no focal deficits,normal coordination, muscle strength and tone   Skin:   intact without lesions or rashes   Psych:  alert and cooperative; normal mood and affect; normal attention span and concentration      A/p:  Postoperative/postpartum state: Doing well from postpartum state. Pap is up to date. Contraception plan declines  PFPT discussed  Recommend waiting 1 year before pregnancy     Breanna Mac DO   8:59 AM  04/08/25

## 2025-08-21 ENCOUNTER — APPOINTMENT (OUTPATIENT)
Dept: URBAN - METROPOLITAN AREA CLINIC 25 | Facility: CLINIC | Age: 40
Setting detail: DERMATOLOGY
End: 2025-08-21

## 2025-08-21 PROBLEM — D23.5 OTHER BENIGN NEOPLASM OF SKIN OF TRUNK: Status: ACTIVE | Noted: 2025-08-21

## 2025-08-21 PROCEDURE — ? COUNSELING

## (undated) DEVICE — CURETTE SURG VAC 10 MM CRV RIGID PLAS

## (undated) DEVICE — CARDINAL HEALTH FLEXIBLE LIGHT HANDLE COVER: Brand: CARDINAL HEALTH

## (undated) DEVICE — TUBING, SUCTION, 1/4" X 10', STRAIGHT: Brand: MEDLINE

## (undated) DEVICE — SUTURE VICRYL SZ 0 L36IN ABSRB UD L36MM CT-1 1/2 CIR J946H

## (undated) DEVICE — AMD ANTIMICROBIAL NON-ADHERENT PAD,0.2% POLYHEXAMETHYLENE BIGUANIDE HCI (PHMB): Brand: TELFA

## (undated) DEVICE — Z DISCONTINUED NO SUB IDED TRAY CATH 16FR PVC INTMIT STR TIP PREATTACH TO 1000ML COLL

## (undated) DEVICE — ELECTRODE PT RET AD L9FT HI MOIST COND ADH HYDRGEL CORDED

## (undated) DEVICE — MINOR LITHOTOMY PACK: Brand: MEDLINE INDUSTRIES, INC.

## (undated) DEVICE — PACK SURG CUST C SECT CDS SFE

## (undated) DEVICE — SUTURE VICRYL SZ 1 L27IN ABSRB UD CT-1 L36MM 1/2 CIR J261H

## (undated) DEVICE — SUTURE MONOCRYL SZ 4-0 L27IN ABSRB UD L19MM PS-2 1/2 CIR PRIM Y426H

## (undated) DEVICE — PENCIL ES L3M BTTN SWCH S STL HEX LOK BLDE ELECTRD HOLSTER

## (undated) DEVICE — Device: Brand: PORTEX

## (undated) DEVICE — AMD ANTIMICROBIAL GAUZE SPONGES,12 PLY USP TYPE VII, 0.2% POLYHEXAMETHYLENE BIGUANIDE HCI (PHMB): Brand: CURITY

## (undated) DEVICE — GLOVE SURG SZ 6 CRM LTX FREE POLYISOPRENE POLYMER BEAD ANTI

## (undated) DEVICE — SUTURE PDS II SZ 0 L27IN ABSRB VLT L36MM CT-1 1/2 CIR Z340H

## (undated) DEVICE — DRAPE,UNDERBUTTOCKS,PCH,STERILE: Brand: MEDLINE

## (undated) DEVICE — Z DISCONTINUED NO SUB IDED TRAY PREP DRY W/ PREM GLV 2 APPL 6 SPNG 2 UNDPD 1 OVERWRAP

## (undated) DEVICE — SOLUTION IRRIG 1000ML 0.9% SOD CHL USP POUR PLAS BTL

## (undated) DEVICE — PVC URETHRAL CATHETER: Brand: DOVER

## (undated) DEVICE — KENDALL SCD EXPRESS SLEEVES, KNEE LENGTH, MEDIUM: Brand: KENDALL SCD

## (undated) DEVICE — U.V.A.C. SWIVEL HANDLE W/TUBING, 6': Brand: CONVERTORS